# Patient Record
Sex: FEMALE | Race: WHITE | NOT HISPANIC OR LATINO | Employment: FULL TIME | RURAL
[De-identification: names, ages, dates, MRNs, and addresses within clinical notes are randomized per-mention and may not be internally consistent; named-entity substitution may affect disease eponyms.]

---

## 2021-10-14 ENCOUNTER — OFFICE VISIT (OUTPATIENT)
Dept: FAMILY MEDICINE CLINIC | Facility: CLINIC | Age: 31
End: 2021-10-14

## 2021-10-14 ENCOUNTER — LAB (OUTPATIENT)
Dept: LAB | Facility: HOSPITAL | Age: 31
End: 2021-10-14

## 2021-10-14 VITALS
HEIGHT: 66 IN | TEMPERATURE: 98 F | OXYGEN SATURATION: 100 % | DIASTOLIC BLOOD PRESSURE: 78 MMHG | SYSTOLIC BLOOD PRESSURE: 104 MMHG | WEIGHT: 269.13 LBS | HEART RATE: 76 BPM | RESPIRATION RATE: 20 BRPM | BODY MASS INDEX: 43.25 KG/M2

## 2021-10-14 DIAGNOSIS — M67.88 BILATERAL ACHILLES TENDONOSIS: ICD-10-CM

## 2021-10-14 DIAGNOSIS — Z13.1 DIABETES MELLITUS SCREENING: ICD-10-CM

## 2021-10-14 DIAGNOSIS — Z11.3 SCREENING EXAMINATION FOR STD (SEXUALLY TRANSMITTED DISEASE): ICD-10-CM

## 2021-10-14 DIAGNOSIS — Z86.39 HISTORY OF VITAMIN D DEFICIENCY: ICD-10-CM

## 2021-10-14 DIAGNOSIS — R09.89 CHEST CONGESTION: ICD-10-CM

## 2021-10-14 DIAGNOSIS — R53.83 FATIGUE, UNSPECIFIED TYPE: ICD-10-CM

## 2021-10-14 DIAGNOSIS — R07.9 LEFT-SIDED CHEST PAIN: ICD-10-CM

## 2021-10-14 DIAGNOSIS — E89.0 HYPOTHYROIDISM ASSOCIATED WITH SURGICAL PROCEDURE: ICD-10-CM

## 2021-10-14 DIAGNOSIS — R07.9 LEFT-SIDED CHEST PAIN: Primary | ICD-10-CM

## 2021-10-14 DIAGNOSIS — E89.0 HYPOTHYROIDISM ASSOCIATED WITH SURGICAL PROCEDURE: Chronic | ICD-10-CM

## 2021-10-14 DIAGNOSIS — J06.9 RECENT URI: ICD-10-CM

## 2021-10-14 DIAGNOSIS — Z83.49 FAMILY HISTORY OF B12 DEFICIENCY: ICD-10-CM

## 2021-10-14 DIAGNOSIS — Z13.220 LIPID SCREENING: ICD-10-CM

## 2021-10-14 DIAGNOSIS — M21.611 BUNION, RIGHT FOOT: ICD-10-CM

## 2021-10-14 LAB
25(OH)D3 SERPL-MCNC: 25.8 NG/ML (ref 30–100)
ALBUMIN SERPL-MCNC: 4.4 G/DL (ref 3.5–5.2)
ALBUMIN/GLOB SERPL: 1.6 G/DL
ALP SERPL-CCNC: 84 U/L (ref 39–117)
ALT SERPL W P-5'-P-CCNC: 48 U/L (ref 1–33)
ANION GAP SERPL CALCULATED.3IONS-SCNC: 12.8 MMOL/L (ref 5–15)
AST SERPL-CCNC: 32 U/L (ref 1–32)
BILIRUB SERPL-MCNC: 0.5 MG/DL (ref 0–1.2)
BUN SERPL-MCNC: 12 MG/DL (ref 6–20)
BUN/CREAT SERPL: 15.2 (ref 7–25)
CALCIUM SPEC-SCNC: 9.1 MG/DL (ref 8.6–10.5)
CANDIDA ALBICANS: NEGATIVE
CHLORIDE SERPL-SCNC: 103 MMOL/L (ref 98–107)
CHOLEST SERPL-MCNC: 238 MG/DL (ref 0–200)
CO2 SERPL-SCNC: 24.2 MMOL/L (ref 22–29)
CREAT SERPL-MCNC: 0.79 MG/DL (ref 0.57–1)
DEPRECATED RDW RBC AUTO: 51.2 FL (ref 37–54)
ERYTHROCYTE [DISTWIDTH] IN BLOOD BY AUTOMATED COUNT: 14.4 % (ref 12.3–15.4)
FOLATE SERPL-MCNC: 10.1 NG/ML (ref 4.78–24.2)
GARDNERELLA VAGINALIS: NEGATIVE
GFR SERPL CREATININE-BSD FRML MDRD: 84 ML/MIN/1.73
GLOBULIN UR ELPH-MCNC: 2.8 GM/DL
GLUCOSE SERPL-MCNC: 85 MG/DL (ref 65–99)
HBA1C MFR BLD: 5.44 % (ref 4.8–5.6)
HCT VFR BLD AUTO: 39.7 % (ref 34–46.6)
HDLC SERPL-MCNC: 53 MG/DL (ref 40–60)
HGB BLD-MCNC: 13.8 G/DL (ref 12–15.9)
IRON 24H UR-MRATE: 141 MCG/DL (ref 37–145)
IRON SATN MFR SERPL: 34 % (ref 20–50)
LDLC SERPL CALC-MCNC: 160 MG/DL (ref 0–100)
LDLC/HDLC SERPL: 2.96 {RATIO}
MCH RBC QN AUTO: 33.8 PG (ref 26.6–33)
MCHC RBC AUTO-ENTMCNC: 34.8 G/DL (ref 31.5–35.7)
MCV RBC AUTO: 97.3 FL (ref 79–97)
PLATELET # BLD AUTO: 282 10*3/MM3 (ref 140–450)
PMV BLD AUTO: 10.6 FL (ref 6–12)
POTASSIUM SERPL-SCNC: 4.2 MMOL/L (ref 3.5–5.2)
PROT SERPL-MCNC: 7.2 G/DL (ref 6–8.5)
RBC # BLD AUTO: 4.08 10*6/MM3 (ref 3.77–5.28)
SODIUM SERPL-SCNC: 140 MMOL/L (ref 136–145)
T VAGINALIS DNA VAG QL PROBE+SIG AMP: NEGATIVE
T3FREE SERPL-MCNC: 3.19 PG/ML (ref 2–4.4)
T4 FREE SERPL-MCNC: 1.35 NG/DL (ref 0.93–1.7)
TIBC SERPL-MCNC: 411 MCG/DL (ref 298–536)
TRANSFERRIN SERPL-MCNC: 276 MG/DL (ref 200–360)
TRIGL SERPL-MCNC: 140 MG/DL (ref 0–150)
TSH SERPL DL<=0.05 MIU/L-ACNC: 2.93 UIU/ML (ref 0.27–4.2)
VIT B12 BLD-MCNC: <150 PG/ML (ref 211–946)
VLDLC SERPL-MCNC: 25 MG/DL (ref 5–40)
WBC # BLD AUTO: 7.32 10*3/MM3 (ref 3.4–10.8)

## 2021-10-14 PROCEDURE — 99204 OFFICE O/P NEW MOD 45 MIN: CPT | Performed by: NURSE PRACTITIONER

## 2021-10-14 PROCEDURE — 93010 ELECTROCARDIOGRAM REPORT: CPT | Performed by: INTERNAL MEDICINE

## 2021-10-14 PROCEDURE — 93005 ELECTROCARDIOGRAM TRACING: CPT | Performed by: NURSE PRACTITIONER

## 2021-10-14 PROCEDURE — 80050 GENERAL HEALTH PANEL: CPT

## 2021-10-14 PROCEDURE — 82607 VITAMIN B-12: CPT

## 2021-10-14 PROCEDURE — 82306 VITAMIN D 25 HYDROXY: CPT

## 2021-10-14 PROCEDURE — 86769 SARS-COV-2 COVID-19 ANTIBODY: CPT

## 2021-10-14 PROCEDURE — 82746 ASSAY OF FOLIC ACID SERUM: CPT

## 2021-10-14 PROCEDURE — 83540 ASSAY OF IRON: CPT

## 2021-10-14 PROCEDURE — 84439 ASSAY OF FREE THYROXINE: CPT

## 2021-10-14 PROCEDURE — 84466 ASSAY OF TRANSFERRIN: CPT

## 2021-10-14 PROCEDURE — 87661 TRICHOMONAS VAGINALIS AMPLIF: CPT | Performed by: NURSE PRACTITIONER

## 2021-10-14 PROCEDURE — 87510 GARDNER VAG DNA DIR PROBE: CPT | Performed by: NURSE PRACTITIONER

## 2021-10-14 PROCEDURE — 87591 N.GONORRHOEAE DNA AMP PROB: CPT | Performed by: NURSE PRACTITIONER

## 2021-10-14 PROCEDURE — 84481 FREE ASSAY (FT-3): CPT

## 2021-10-14 PROCEDURE — 87660 TRICHOMONAS VAGIN DIR PROBE: CPT | Performed by: NURSE PRACTITIONER

## 2021-10-14 PROCEDURE — 96372 THER/PROPH/DIAG INJ SC/IM: CPT | Performed by: NURSE PRACTITIONER

## 2021-10-14 PROCEDURE — 83036 HEMOGLOBIN GLYCOSYLATED A1C: CPT

## 2021-10-14 PROCEDURE — 80061 LIPID PANEL: CPT

## 2021-10-14 PROCEDURE — 87491 CHLMYD TRACH DNA AMP PROBE: CPT | Performed by: NURSE PRACTITIONER

## 2021-10-14 PROCEDURE — 87480 CANDIDA DNA DIR PROBE: CPT | Performed by: NURSE PRACTITIONER

## 2021-10-14 RX ORDER — TESTOSTERONE CYPIONATE 200 MG/ML
10 INJECTION INTRAMUSCULAR ONCE
Status: COMPLETED | OUTPATIENT
Start: 2021-10-14 | End: 2021-10-14

## 2021-10-14 RX ORDER — LEVOTHYROXINE SODIUM 175 MCG
TABLET ORAL
COMMUNITY
Start: 2021-07-15 | End: 2022-02-24 | Stop reason: SDUPTHER

## 2021-10-14 RX ORDER — FUROSEMIDE 20 MG/1
TABLET ORAL
COMMUNITY

## 2021-10-14 RX ORDER — ALBUTEROL SULFATE 2.5 MG/3ML
2.5 SOLUTION RESPIRATORY (INHALATION) ONCE
Status: COMPLETED | OUTPATIENT
Start: 2021-10-14 | End: 2021-10-14

## 2021-10-14 RX ADMIN — TESTOSTERONE CYPIONATE 10 MG: 200 INJECTION INTRAMUSCULAR at 10:54

## 2021-10-14 RX ADMIN — ALBUTEROL SULFATE 2.5 MG: 2.5 SOLUTION RESPIRATORY (INHALATION) at 10:48

## 2021-10-14 NOTE — PROGRESS NOTES
"Chief Complaint  earache (bilateral)/headache pain in chest    Subjective          Sherrie Smith presents to [facility] - [facility]    PCP: just moved to [place], plans to establish with PCP at [facility]    CC: \"ear infection; lungs hurting; achilles pain; fatigue; chest pain\"    Presents with multiple complaints today wanting addressed until she can establish with new PCP.  Has not seen previous PCP (ABIOLA White in [place]) recently.      1. C/O illness since 9-14 including earache, head pressure/headaches, dizziness.  Reports she was seen at [facility] on 2 occasions--10/3 was dx with double OM and treated with Augmentin, returned on 10-8 with worsening symptoms and was diagnosed with bilateral pneumonia.  Had negative Covid test at that visit.  Was treated with Rocephin and Levaquin.  Denies cough, only c/o chest pain and occasional dyspnea.  Has not had Covid test.  Symptoms really haven't improved with treatment and would like to be evaluated today.  Was given Flonase at initial [facility] appt on 10/3, but stopped using.  Not taking anything else to assist. Denies loss of smell/taste.     2. C/O longstanding fatigue.  Reports she had thyroid nodules and enlargement with subsequent thyroidectomy in June 2021.  Last had thyroid levels checked in August and hasn't followed up since.  Admits that she isn't taking meds regularly.  Also c/o history of B12 and Vit D deficiency and was on B12 injections at one time, but hasn't had levels checked or treated in a long time.      3.  C/O bilateral leg swelling on/off for the last 6-12 months daily.  Seems to be worse by the end of the day.  Had been prescribed Lasix at one time, but stopped taking due to concerns about her potassium getting too low.  Doesn't wear compression stockings.  Currently working in medical records, sits for long periods of time.  Trying to eat healthy and doesn't " "believe she is eating a lot of sodium.  Reports she doesn't believe it is associated with her cycles/hormones as she has irregular cycles and has been dx with endometriosis in the past.      4.  Requesting STD screening today as well.  Denies current symptoms or known exposures.  Denies new partners.      5.  C/O achilles \"knots\" for a while and she has pain with walking.  Longstanding history of bunions and has surgery on left foot in the past and was recommended on right, but never had done.  Still has pain/stiffness in left foot and chronic pain in right foot.  Would like to see podiatry.       Review of Systems   Constitutional: Positive for fatigue and unexpected weight change (reports weight gain). Negative for appetite change and fever.   HENT: Positive for congestion, ear pain and sinus pressure (at times). Negative for ear discharge, postnasal drip, rhinorrhea, sinus pain, sore throat and trouble swallowing.    Eyes: Negative.    Respiratory: Positive for shortness of breath (occaisonal). Negative for cough, chest tightness and wheezing.    Cardiovascular: Positive for chest pain (mid to left side) and leg swelling (chronic). Negative for palpitations.   Gastrointestinal:        Sees Dr. Sinclair--had upper/lower scope earlier this year and polyp was removed     Genitourinary: Negative.    Musculoskeletal: Positive for arthralgias (bilateral foot pain).   Skin: Negative.    Allergic/Immunologic: Positive for food allergies (seen by Dr. Salazar earlier this year and had allergy testing--reports allergies to milk, corn, wheat and peanuts).   Neurological: Positive for dizziness, light-headedness and headaches. Negative for speech difficulty.        Objective   Vital Signs:   /78 (BP Location: Right arm, Patient Position: Sitting, Cuff Size: Large Adult)   Pulse 76   Temp 98 °F (36.7 °C) (Temporal)   Resp 20   Ht 167.6 cm (66\")   Wt 122 kg (269 lb 2 oz)   SpO2 100%   BMI 43.44 kg/m²       Physical " Exam  Vitals and nursing note reviewed.   Constitutional:       General: She is not in acute distress.     Appearance: Normal appearance. She is obese. She is not ill-appearing.   HENT:      Head: Normocephalic and atraumatic.      Right Ear: Ear canal normal. A middle ear effusion (mild) is present. Tympanic membrane is not injected or erythematous.      Left Ear: Ear canal normal. A middle ear effusion ( minimal) is present. Tympanic membrane is not injected or erythematous.      Nose: Congestion (mild) present.      Right Sinus: No maxillary sinus tenderness or frontal sinus tenderness.      Left Sinus: No maxillary sinus tenderness or frontal sinus tenderness.      Mouth/Throat:      Mouth: Mucous membranes are moist.      Pharynx: Oropharynx is clear. No pharyngeal swelling, oropharyngeal exudate or posterior oropharyngeal erythema.      Comments: Denies localized sinus pressure, but c/o generalized head pressure/headaches  Eyes:      General: No scleral icterus.        Right eye: No discharge.         Left eye: No discharge.      Conjunctiva/sclera: Conjunctivae normal.      Pupils: Pupils are equal, round, and reactive to light.   Neck:      Comments: Surgical scar noted to anterior neck    Cardiovascular:      Rate and Rhythm: Normal rate and regular rhythm.      Pulses:           Dorsalis pedis pulses are 2+ on the right side and 2+ on the left side.        Posterior tibial pulses are 2+ on the right side and 2+ on the left side.   Pulmonary:      Effort: Pulmonary effort is normal. No respiratory distress.      Breath sounds: No wheezing, rhonchi or rales.      Comments: Decreased aeration with significant congestion noted.  Albuterol neb in office. Patient denies any improvement in chest discomfort, but significantly improved aeration is noted.   Musculoskeletal:      Cervical back: Neck supple. No tenderness.      Right lower leg: Edema ( trace pitting) present.      Left lower leg: Edema ( trace  pitting) present.      Right foot: Bunion present.      Left foot: Bunion ( surgical scar noted) present.        Feet:    Feet:      Right foot:      Skin integrity: Skin integrity normal.      Left foot:      Skin integrity: Skin integrity normal.   Lymphadenopathy:      Cervical: No cervical adenopathy.   Skin:     General: Skin is warm and dry.      Capillary Refill: Capillary refill takes less than 2 seconds.   Neurological:      General: No focal deficit present.      Mental Status: She is alert and oriented to person, place, and time.   Psychiatric:         Mood and Affect: Mood normal.         Thought Content: Thought content normal.          Result Review :            EKG: NSR with sinus arrhythmia, 67 bpm.  Official cardiology read pending.      Assessment and Plan    Diagnoses and all orders for this visit:    1. Left-sided chest pain (Primary)  -     ECG 12 Lead  -     SARS-CoV-2 Antibodies (Roche); Future    2. Chest congestion  -     albuterol (PROVENTIL) nebulizer solution 0.083% 2.5 mg/3mL  -     dexamethasone sodium phosphate injection 10 mg  -     SARS-CoV-2 Antibodies (Roche); Future  -     XR Chest PA & Lateral    3. Bilateral Achilles tendonosis  -     Ambulatory Referral to Podiatry    4. Bunion, right foot  -     Ambulatory Referral to Podiatry    5. Fatigue, unspecified type  -     CBC No Differential; Future  -     Comprehensive metabolic panel; Future  -     Vitamin B12; Future  -     Folate; Future  -     Iron Profile; Future  -     SARS-CoV-2 Antibodies (Roche); Future  -     XR sinuses 3+ vw    6. Hypothyroidism associated with surgical procedure  -     TSH; Future  -     T3, free; Future  -     T4, free; Future    7. History of vitamin D deficiency  -     Vitamin D 25 hydroxy; Future    8. Family history of B12 deficiency  -     Vitamin B12; Future    9. Diabetes mellitus screening  -     Hemoglobin A1c; Future    10. Recent URI  -     SARS-CoV-2 Antibodies (Roche); Future  -     XR  sinuses 3+ vw  -     XR Chest PA & Lateral    11. Screening examination for STD (sexually transmitted disease)  -     Chlamydia trachomatis, Neisseria gonorrhoeae, Trichomonas vaginalis, PCR - Swab, Vagina  -     Gardnerella vaginalis, Trichomonas vaginalis, Candida albicans, DNA - Swab, Vagina    12. Lipid screening  -     Lipid panel; Future      Labs/xrays as above--will notify with results when available.      Decadron 10 mg IM x 1 in office  Restart Flonase daily  Declines need for inhaler at this time  Smoking cessation encouraged    Patient to schedule appt to establish care with PCP prior to leaving today    Referral to Podiatry placed  Depending on thyroid studies, may need referral to Endocrinology    RTW: 10-    Return to Same Day Clinic PRN      This document has been electronically signed by ABIOLA Hernandez on October 14, 2021 18:17 CDT,.    I spent 57 minutes caring for Sherrie on this date of service. This time includes time spent by me in the following activities:performing a medically appropriate examination and/or evaluation , counseling and educating the patient/family/caregiver, ordering medications, tests, or procedures, referring and communicating with other health care professionals  and documenting information in the medical record

## 2021-10-15 ENCOUNTER — TELEPHONE (OUTPATIENT)
Dept: FAMILY MEDICINE CLINIC | Facility: CLINIC | Age: 31
End: 2021-10-15

## 2021-10-15 DIAGNOSIS — E55.9 VITAMIN D INSUFFICIENCY: ICD-10-CM

## 2021-10-15 DIAGNOSIS — E78.5 HYPERLIPIDEMIA, UNSPECIFIED HYPERLIPIDEMIA TYPE: Primary | ICD-10-CM

## 2021-10-15 DIAGNOSIS — E53.8 B12 DEFICIENCY: Primary | ICD-10-CM

## 2021-10-15 LAB
C TRACH RRNA CVX QL NAA+PROBE: NEGATIVE
N GONORRHOEA RRNA SPEC QL NAA+PROBE: NEGATIVE
QT INTERVAL: 418 MS
QTC INTERVAL: 441 MS
SARS-COV-2 AB SERPL QL IA: NEGATIVE
TRICHOMONAS VAGINALIS PCR: NEGATIVE

## 2021-10-15 RX ORDER — CHOLECALCIFEROL (VITAMIN D3) 25 MCG
2000 CAPSULE ORAL DAILY
Qty: 60 CAPSULE | Refills: 5 | Status: SHIPPED | OUTPATIENT
Start: 2021-10-15 | End: 2022-05-24 | Stop reason: SDUPTHER

## 2021-10-15 RX ORDER — CHLORAL HYDRATE 500 MG
2000 CAPSULE ORAL
Qty: 30 CAPSULE | Refills: 5 | Status: SHIPPED | OUTPATIENT
Start: 2021-10-15 | End: 2022-09-09

## 2021-10-15 NOTE — TELEPHONE ENCOUNTER
Please give her a call back and let her know her cholesterol numbers were and how much omega 3 she needs to get.

## 2021-10-15 NOTE — PROGRESS NOTES
Pt notified of results, pt states she would like the b12 injections sent as an rx and she will give her self. Pt also wants vit d supp. Sent to pharmacy. Pt wants to take omega 3 and not cholesterol meds right now.

## 2021-10-19 NOTE — TELEPHONE ENCOUNTER
Randall has sent over her omega 3 to the pharmacy and her cholesterol results are accessible via her LMN-1t. I will send pt a message. :)

## 2021-11-01 ENCOUNTER — OFFICE VISIT (OUTPATIENT)
Dept: FAMILY MEDICINE CLINIC | Facility: CLINIC | Age: 31
End: 2021-11-01

## 2021-11-01 VITALS
WEIGHT: 278 LBS | OXYGEN SATURATION: 100 % | BODY MASS INDEX: 44.68 KG/M2 | TEMPERATURE: 97.1 F | DIASTOLIC BLOOD PRESSURE: 68 MMHG | HEART RATE: 88 BPM | SYSTOLIC BLOOD PRESSURE: 102 MMHG | HEIGHT: 66 IN

## 2021-11-01 DIAGNOSIS — N80.9 ENDOMETRIOSIS: ICD-10-CM

## 2021-11-01 DIAGNOSIS — E66.01 CLASS 3 SEVERE OBESITY WITHOUT SERIOUS COMORBIDITY WITH BODY MASS INDEX (BMI) OF 40.0 TO 44.9 IN ADULT, UNSPECIFIED OBESITY TYPE (HCC): Primary | ICD-10-CM

## 2021-11-01 PROBLEM — E03.9 HYPOTHYROIDISM: Status: ACTIVE | Noted: 2021-02-02

## 2021-11-01 PROCEDURE — 99214 OFFICE O/P EST MOD 30 MIN: CPT | Performed by: STUDENT IN AN ORGANIZED HEALTH CARE EDUCATION/TRAINING PROGRAM

## 2021-11-01 RX ORDER — MULTIPLE VITAMINS W/ MINERALS TAB 9MG-400MCG
1 TAB ORAL DAILY
COMMUNITY
End: 2022-09-09

## 2021-11-01 RX ORDER — PHENTERMINE HYDROCHLORIDE 37.5 MG/1
37.5 CAPSULE ORAL EVERY MORNING
Qty: 30 CAPSULE | Refills: 0 | Status: SHIPPED | OUTPATIENT
Start: 2021-11-01 | End: 2022-02-24 | Stop reason: SDUPTHER

## 2021-11-01 NOTE — PROGRESS NOTES
"Subjective:  Sherrie Smith is a 31 y.o. female who presents for     Endometriosis; states that has very irregular periods, was supposed to have hysterectomy but became pregnant approximately 4 years ago with her son. Since then, has not been reevaluated as her ob/gyn has since retired. Abdominal pain still present, intermittent, no menorrhagia, fever, chills, constipation, dysuria.     Obesity; states that over the past year has been working on increased diet, exercise and weight loss without success. Was hopeful that when her thyroid became well controlled that she would have an easier time losing weight. Interested in medications and nutrition referral at this time. Believe she was on phentermine in the past, did not have any issues with medication, denied cardiac history.    Patient Active Problem List   Diagnosis   • Hypothyroidism associated with surgical procedure   • Hypothyroidism     Vitals:    Vitals:    11/01/21 1730   BP: 102/68   BP Location: Left arm   Patient Position: Sitting   Cuff Size: Large Adult   Pulse: 88   Temp: 97.1 °F (36.2 °C)   SpO2: 100%   Weight: 126 kg (278 lb)   Height: 167.6 cm (66\")     Body mass index is 44.87 kg/m².    Current Outpatient Medications:   •  Cholecalciferol (Vitamin D-3) 25 MCG (1000 UT) capsule, Take 2,000 Units by mouth Daily., Disp: 60 capsule, Rfl: 5  •  furosemide (LASIX) 20 MG tablet, furosemide 20 mg tablet  TAKE ONE TABLET AS NEEDED FOR swelling as needed but not more THAN ONCE PER DAY, Disp: , Rfl:   •  linaclotide (LINZESS) 290 MCG capsule capsule, Take 290 mcg by mouth Daily As Needed., Disp: , Rfl:   •  multivitamin with minerals (MULTIVITAMIN ADULT PO), Take 1 tablet by mouth Daily., Disp: , Rfl:   •  Omega-3 Fatty Acids (fish oil) 1000 MG capsule capsule, Take 2 capsules by mouth Daily With Breakfast., Disp: 30 capsule, Rfl: 5  •  Synthroid 175 MCG tablet, TAKE ONE TABLET EVERY MORNING ON AN EMPTY STOMACH, Disp: , Rfl:   •  Cyanocobalamin 1000 MCG/ML " kit, Administer injection IM or SQ 3 times weekly x 2 weeks and then once monthly injections, Disp: 1 kit, Rfl: 11  •  phentermine 37.5 MG capsule, Take 1 capsule by mouth Every Morning., Disp: 30 capsule, Rfl: 0    Patient Active Problem List   Diagnosis   • Hypothyroidism associated with surgical procedure   • Hypothyroidism     Past Surgical History:   Procedure Laterality Date   • BUNIONECTOMY     • LAPAROSCOPY DIAGNOSTIC / BIOPSY / ASPIRATION / LYSIS     • THYROIDECTOMY       Social History     Socioeconomic History   • Marital status: Single   Tobacco Use   • Smoking status: Current Some Day Smoker     Types: Cigarettes   • Smokeless tobacco: Never Used   • Tobacco comment: a couple a week   Vaping Use   • Vaping Use: Never used   Substance and Sexual Activity   • Alcohol use: Never   • Drug use: Never   • Sexual activity: Yes     Partners: Male     Birth control/protection: Condom     Family History   Problem Relation Age of Onset   • Hyperlipidemia Mother    • Thyroid disease Mother    • Atrial fibrillation Father    • Thyroid disease Maternal Grandmother      Lab on 10/14/2021   Component Date Value Ref Range Status   • WBC 10/14/2021 7.32  3.40 - 10.80 10*3/mm3 Final   • RBC 10/14/2021 4.08  3.77 - 5.28 10*6/mm3 Final   • Hemoglobin 10/14/2021 13.8  12.0 - 15.9 g/dL Final   • Hematocrit 10/14/2021 39.7  34.0 - 46.6 % Final   • MCV 10/14/2021 97.3* 79.0 - 97.0 fL Final   • MCH 10/14/2021 33.8* 26.6 - 33.0 pg Final   • MCHC 10/14/2021 34.8  31.5 - 35.7 g/dL Final   • RDW 10/14/2021 14.4  12.3 - 15.4 % Final   • RDW-SD 10/14/2021 51.2  37.0 - 54.0 fl Final   • MPV 10/14/2021 10.6  6.0 - 12.0 fL Final   • Platelets 10/14/2021 282  140 - 450 10*3/mm3 Final   • Glucose 10/14/2021 85  65 - 99 mg/dL Final   • BUN 10/14/2021 12  6 - 20 mg/dL Final   • Creatinine 10/14/2021 0.79  0.57 - 1.00 mg/dL Final   • Sodium 10/14/2021 140  136 - 145 mmol/L Final   • Potassium 10/14/2021 4.2  3.5 - 5.2 mmol/L Final   •  Chloride 10/14/2021 103  98 - 107 mmol/L Final   • CO2 10/14/2021 24.2  22.0 - 29.0 mmol/L Final   • Calcium 10/14/2021 9.1  8.6 - 10.5 mg/dL Final   • Total Protein 10/14/2021 7.2  6.0 - 8.5 g/dL Final   • Albumin 10/14/2021 4.40  3.50 - 5.20 g/dL Final   • ALT (SGPT) 10/14/2021 48* 1 - 33 U/L Final   • AST (SGOT) 10/14/2021 32  1 - 32 U/L Final   • Alkaline Phosphatase 10/14/2021 84  39 - 117 U/L Final   • Total Bilirubin 10/14/2021 0.5  0.0 - 1.2 mg/dL Final   • eGFR Non  Amer 10/14/2021 84  >60 mL/min/1.73 Final   • Globulin 10/14/2021 2.8  gm/dL Final   • A/G Ratio 10/14/2021 1.6  g/dL Final   • BUN/Creatinine Ratio 10/14/2021 15.2  7.0 - 25.0 Final   • Anion Gap 10/14/2021 12.8  5.0 - 15.0 mmol/L Final   • TSH 10/14/2021 2.930  0.270 - 4.200 uIU/mL Final   • T3, Free 10/14/2021 3.19  2.00 - 4.40 pg/mL Final   • Free T4 10/14/2021 1.35  0.93 - 1.70 ng/dL Final   • 25 Hydroxy, Vitamin D 10/14/2021 25.8* 30.0 - 100.0 ng/ml Final   • Hemoglobin A1C 10/14/2021 5.44  4.80 - 5.60 % Final   • Vitamin B-12 10/14/2021 <150* 211 - 946 pg/mL Final   • Folate 10/14/2021 10.10  4.78 - 24.20 ng/mL Final   • Iron 10/14/2021 141  37 - 145 mcg/dL Final   • Iron Saturation 10/14/2021 34  20 - 50 % Final   • Transferrin 10/14/2021 276  200 - 360 mg/dL Final   • TIBC 10/14/2021 411  298 - 536 mcg/dL Final   • SARS-COV-2 ANTIBODIES 10/14/2021 Negative  Negative Final    This sample does not contain detectable SARS-CoV-2 antibodies. This  negative result does not rule out SARS-CoV-2 infection. Correlation  with epidemiologic risk factors and other clinical and laboratory  findings is recommended. Serologic results should not be used as the  sole basis to diagnose or exclude recent SARS-CoV-2 infection.  This assay will not detect antibodies induced by the currently  available SARS-CoV-2 vaccines. The current vaccines elicit  antibodies specific to the viral spike protein. [employer] offers  two test codes that detect viral  spike-specific antibodies:  250096 SARS-CoV-2 Semi-Quantitative Total Antibody, Juan and  073635 SARS-CoV-2 Antibody, IgG, Juan (Qualitative).  Positive results with this SARS-CoV-2 Antibodies, Nucleocapsid  assay suggest recent or previous natural infection with  SARS-CoV-2.   • Total Cholesterol 10/14/2021 238* 0 - 200 mg/dL Final   • Triglycerides 10/14/2021 140  0 - 150 mg/dL Final   • HDL Cholesterol 10/14/2021 53  40 - 60 mg/dL Final   • LDL Cholesterol  10/14/2021 160* 0 - 100 mg/dL Final   • VLDL Cholesterol 10/14/2021 25  5 - 40 mg/dL Final   • LDL/HDL Ratio 10/14/2021 2.96   Final   Office Visit on 10/14/2021   Component Date Value Ref Range Status   • QT Interval 10/14/2021 418  ms Final   • QTC Interval 10/14/2021 441  ms Final   • Chlamydia DNA by PCR 10/14/2021 Negative  Negative Final   • Neisseria gonorrhoeae by PCR 10/14/2021 Negative  Negative Final   • Trichomonas vaginalis PCR 10/14/2021 Negative  Negative, Invalid Final   • SLIME ALBICANS 10/14/2021 Negative  Negative Final   • GARDNERELLA VAGINALIS 10/14/2021 Negative  Negative Final   • TRICHOMONAS VAGINALIS 10/14/2021 Negative  Negative Final      XR sinuses 3+ vw  Narrative: Procedure:  Sinuses.        Indication:  Sinusitis. Head pressure.    Technique:  4 views   .    Prior relevant exam:  None.      No evidence of acute bony, soft tissue, or joint abnormality is  noted. Bone mineralization is within normal limits. The  visualized joint spaces appear intact. Normal paranasal sinuses.  No opacification.    No mucoperiosteal thickening or air-fluid levels.   Impression: 1.  Normal sinuses.       Electronically signed by:  Herson Chapa MD  10/14/2021 1:51 PM CDT  Workstation: [id number]  XR Chest PA & Lateral  Narrative: Chest x-ray PA and lateral     CLINICAL INDICATION: Shortness of breath. Recent pneumonia.    COMPARISON: Chest July 11, 2019.    FINDINGS: Cardiac silhouette is normal in size. Pulmonary  vascularity is unremarkable.     No  focal infiltrate or consolidation.  No pleural effusion.  No  pneumothorax.  Impression: No evidence of active disease.    Electronically signed by:  Herson Chapa MD  10/14/2021 1:27 PM CDT  Workstation: [id number]      @[employer]@  Immunization History   Administered Date(s) Administered   • DTP 1989, 1989, 1989, 07/22/1992, 08/06/1993   • Hep B, Adolescent or Pediatric 10/02/2006   • HiB 07/22/1992, 08/06/1993   • MMR 05/11/1990, 08/30/1994   • OPV 1989, 1989, 1989, 08/06/1993   • Td 10/02/2006   • Tdap 09/19/2016     The following portions of the patient's history were reviewed and updated as appropriate: allergies, current medications, past family history, past medical history, past social history, past surgical history and problem list.    PHQ-9 Total Score: 0         Physical Exam  Constitutional:       Appearance: Normal appearance. She is obese.   HENT:      Head: Normocephalic and atraumatic.      Right Ear: External ear normal.      Left Ear: External ear normal.   Eyes:      General:         Right eye: No discharge.         Left eye: No discharge.      Conjunctiva/sclera: Conjunctivae normal.   Cardiovascular:      Rate and Rhythm: Normal rate and regular rhythm.      Pulses: Normal pulses.      Heart sounds: Normal heart sounds. No murmur heard.      Pulmonary:      Effort: Pulmonary effort is normal. No respiratory distress.      Breath sounds: Normal breath sounds.   Abdominal:      General: There is no distension.      Palpations: Abdomen is soft.      Tenderness: There is no abdominal tenderness.   Musculoskeletal:      Cervical back: Normal range of motion.      Right lower leg: No edema.      Left lower leg: No edema.   Lymphadenopathy:      Cervical: No cervical adenopathy.   Neurological:      Mental Status: She is alert. Mental status is at baseline.   Psychiatric:         Mood and Affect: Mood normal.         Behavior: Behavior normal.         Assessment/Plan     Diagnosis Plan   1. Class 3 severe obesity without serious comorbidity with body mass index (BMI) of 40.0 to 44.9 in adult, unspecified obesity type (HCC)  Ambulatory Referral to Nutrition Services    phentermine 37.5 MG capsule   2. Endometriosis  Ambulatory Referral to Obstetrics / Gynecology      Orders Placed This Encounter   Procedures   • Ambulatory Referral to Nutrition Services     Referral Priority:   Routine     Referral Type:   Health Education     Referral Reason:   Specialty Services Required     Number of Visits Requested:   1   • Ambulatory Referral to Obstetrics / Gynecology     Referral Priority:   Routine     Referral Type:   Consultation     Referral Reason:   Specialty Services Required     Requested Specialty:   Obstetrics and Gynecology     Number of Visits Requested:   1     Obesity; will refer to nutrition, after prolonged discussion, due to BMI, benefits of phentermine outweigh risks. Discussed possible reflux medication, patient voiced understanding, Ty reviewed and appropriate, follow-up in 1 month, sooner if needed.    Endometriosis; needs referral to OB/GYN for further work-up, management. No red flags on exam today, reassuring.    Previous labs reviewed and reassuring, will address immunization, health maintenance issues at follow-up.          This document has been electronically signed by James Angulo MD on November 1, 2021 19:33 CDT

## 2021-11-18 ENCOUNTER — TELEPHONE (OUTPATIENT)
Dept: FAMILY MEDICINE CLINIC | Facility: CLINIC | Age: 31
End: 2021-11-18

## 2021-11-18 NOTE — TELEPHONE ENCOUNTER
Patient called stating she is having chest pain on the left side and continued to say all over her left side. Patient was given recommendation to go to ER.

## 2021-11-23 ENCOUNTER — OFFICE VISIT (OUTPATIENT)
Dept: PODIATRY | Facility: CLINIC | Age: 31
End: 2021-11-23

## 2021-11-23 VITALS — OXYGEN SATURATION: 98 % | HEIGHT: 66 IN | HEART RATE: 106 BPM | BODY MASS INDEX: 44.68 KG/M2 | WEIGHT: 278 LBS

## 2021-11-23 DIAGNOSIS — M79.671 BILATERAL FOOT PAIN: Primary | ICD-10-CM

## 2021-11-23 DIAGNOSIS — Q66.221 METATARSUS ADDUCTUS OF BOTH FEET: ICD-10-CM

## 2021-11-23 DIAGNOSIS — M76.62 ACHILLES TENDINITIS OF BOTH LOWER EXTREMITIES: ICD-10-CM

## 2021-11-23 DIAGNOSIS — M76.61 ACHILLES TENDINITIS OF BOTH LOWER EXTREMITIES: ICD-10-CM

## 2021-11-23 DIAGNOSIS — Q66.222 METATARSUS ADDUCTUS OF BOTH FEET: ICD-10-CM

## 2021-11-23 DIAGNOSIS — M20.11 VALGUS DEFORMITY OF BOTH GREAT TOES: ICD-10-CM

## 2021-11-23 DIAGNOSIS — M20.12 VALGUS DEFORMITY OF BOTH GREAT TOES: ICD-10-CM

## 2021-11-23 DIAGNOSIS — M79.672 BILATERAL FOOT PAIN: Primary | ICD-10-CM

## 2021-11-23 DIAGNOSIS — M72.2 PLANTAR FASCIITIS: ICD-10-CM

## 2021-11-23 PROCEDURE — 99203 OFFICE O/P NEW LOW 30 MIN: CPT | Performed by: PODIATRIST

## 2021-11-23 RX ORDER — NAPROXEN 500 MG/1
TABLET ORAL
COMMUNITY
Start: 2021-11-19

## 2021-11-23 RX ORDER — LINACLOTIDE 145 UG/1
CAPSULE, GELATIN COATED ORAL
COMMUNITY
Start: 2021-11-19

## 2021-11-23 NOTE — PROGRESS NOTES
Sherrie Smith  1990  31 y.o. female     Patient came to clinic for bilateral foot pain     11/23/2021    Chief Complaint   Patient presents with   • Left Foot - Pain, Bunions   • Right Foot - Pain, Bunions       History of Present Illness    Sherrie Smith is a 31 y.o.female who presents to clinic with chief complaint of foot pain.  Pain is primarily localized to the Achilles tendon area on both feet.  She also complains of painful bunions with certain shoe gear.  Does relate that she has had previous bunion surgery to the left foot many years ago.  She has a residual bunion deformity with continued pain.  Patient denies any injuries.      Past Medical History:   Diagnosis Date   • Bunion    • Endometriosis    • Family history of thyroid problem    • Hammer toe    • Hyperlipidemia    • Hypothyroidism          Past Surgical History:   Procedure Laterality Date   • BUNIONECTOMY     • LAPAROSCOPY DIAGNOSTIC / BIOPSY / ASPIRATION / LYSIS     • THYROIDECTOMY           Family History   Problem Relation Age of Onset   • Hyperlipidemia Mother    • Thyroid disease Mother    • Atrial fibrillation Father    • Heart disease Father    • Thyroid disease Maternal Grandmother        Allergies   Allergen Reactions   • Lortab [Hydrocodone-Acetaminophen] Nausea And Vomiting       Social History     Socioeconomic History   • Marital status: Single   Tobacco Use   • Smoking status: Current Some Day Smoker     Types: Cigarettes   • Smokeless tobacco: Never Used   • Tobacco comment: a couple a week   Vaping Use   • Vaping Use: Never used   Substance and Sexual Activity   • Alcohol use: Never   • Drug use: Never   • Sexual activity: Yes     Partners: Male     Birth control/protection: Condom         Current Outpatient Medications   Medication Sig Dispense Refill   • Cholecalciferol (Vitamin D-3) 25 MCG (1000 UT) capsule Take 2,000 Units by mouth Daily. 60 capsule 5   • Cyanocobalamin 1000 MCG/ML kit Administer injection IM or SQ 3  "times weekly x 2 weeks and then once monthly injections 1 kit 11   • furosemide (LASIX) 20 MG tablet furosemide 20 mg tablet   TAKE ONE TABLET AS NEEDED FOR swelling as needed but not more THAN ONCE PER DAY     • Linzess 145 MCG capsule capsule      • multivitamin with minerals (MULTIVITAMIN ADULT PO) Take 1 tablet by mouth Daily.     • naproxen (NAPROSYN) 500 MG tablet      • Omega-3 Fatty Acids (fish oil) 1000 MG capsule capsule Take 2 capsules by mouth Daily With Breakfast. 30 capsule 5   • phentermine 37.5 MG capsule Take 1 capsule by mouth Every Morning. 30 capsule 0   • Synthroid 175 MCG tablet TAKE ONE TABLET EVERY MORNING ON AN EMPTY STOMACH       No current facility-administered medications for this visit.       Review of Systems   Constitutional: Negative.    HENT: Negative.    Eyes: Negative.    Cardiovascular: Positive for leg swelling.   Gastrointestinal: Negative.    Endocrine: Negative.    Genitourinary: Negative.    Musculoskeletal:        Foot pain    Skin: Negative.    Allergic/Immunologic: Negative.    Neurological: Negative.    Hematological: Negative.    Psychiatric/Behavioral: Negative.          OBJECTIVE    Pulse 106   Ht 167.6 cm (66\")   Wt 126 kg (278 lb)   SpO2 98%   BMI 44.87 kg/m²     Physical Exam  Vitals reviewed.   Constitutional:       General: She is not in acute distress.     Appearance: She is well-developed.   HENT:      Head: Normocephalic and atraumatic.      Nose: Nose normal.   Eyes:      Conjunctiva/sclera: Conjunctivae normal.      Pupils: Pupils are equal, round, and reactive to light.   Pulmonary:      Effort: Pulmonary effort is normal. No respiratory distress.      Breath sounds: No wheezing.   Musculoskeletal:         General: Tenderness and deformity present. Normal range of motion.   Skin:     General: Skin is warm and dry.      Capillary Refill: Capillary refill takes less than 2 seconds.   Neurological:      Mental Status: She is alert and oriented to person, " place, and time.   Psychiatric:         Behavior: Behavior normal.         Thought Content: Thought content normal.          Lower Extremity Exam:     Cardiovascular:    DP/PT pulses palpable b/l    CFT brisk  to all digits b/l  Skin temp is warm to warm from proximal tibia to distal digits b/l  Pedal hair growth present b/l  No erythema or edema noted b/l  Musculoskeletal:  Muscle strength is 5/5 for all muscle groups tested b/l  ROM of the 1st MTP is WNL on the right and decreased on the left with pain.  HAV deformity bilateral  ROM of the TMTJ is WNL b/l  ROM of the MTJ is WNL b/l  ROM of the STJ is  WNL b/l  ROM of the ankle joint is  WNL b/l  Slight pain on palpation to the mid substance of the Achilles tendon bilateral.  Thickening noted to the right Achilles tendon.  Dermatological:   Webspaces 1-4 b/l are clean, dry and intact.   No subcutaneous nodules or masses noted  b/l  Neurological:   Protective sensation intact b/l  Sensation intact to light touch b/l       Foot/Ankle Exam        Procedures        ASSESSMENT AND PLAN    Diagnoses and all orders for this visit:    1. Bilateral foot pain (Primary)  -     XR foot 3+ vw bilateral; Future    2. Valgus deformity of both great toes    3. Metatarsus adductus of both feet    4. Achilles tendinitis of both lower extremities    5. Plantar fasciitis        - Comprehensive foot and ankle exam performed  - Radiographs taken and reviewed. No acute osseous or articular abnormalities.  Moderate HAV deformity bilateral.  - Patient advised to perform stretching exercises, Ice and to make appropriate shoe gear changes to include wearing supportive shoes that do not irritate the back of the heel. Patient also given written instructions on how to correctly perform the stretching of the Achilles tendon/calf stretches. Limit strenuous activity for the next couple of weeks.   - Patient given Handout on Achilles Insertional Tendonitis.  - Discussed treatment of hallux valgus  deformities.  - Rx for custom orthotics  - All questions were answered to the patients satisfaction.  - RTC in 8 weeks             This document has been electronically signed by Denver Magallon DPM on November 24, 2021 16:13 CST     11/24/2021  16:13 CST

## 2021-11-29 ENCOUNTER — TRANSCRIBE ORDERS (OUTPATIENT)
Dept: PODIATRY | Facility: CLINIC | Age: 31
End: 2021-11-29

## 2021-11-29 DIAGNOSIS — M20.10 HALLUX ABDUCTO VALGUS, UNSPECIFIED LATERALITY: Primary | ICD-10-CM

## 2021-11-30 ENCOUNTER — TELEPHONE (OUTPATIENT)
Dept: FAMILY MEDICINE CLINIC | Facility: CLINIC | Age: 31
End: 2021-11-30

## 2021-11-30 NOTE — TELEPHONE ENCOUNTER
Called patient and had to leave a voicemail. Patient needs to reschedule her appointment with Dr. Angulo on Dec 2nd.   He will not have a MA after 4:00

## 2021-12-13 ENCOUNTER — HOSPITAL ENCOUNTER (OUTPATIENT)
Dept: PHYSICAL THERAPY | Facility: HOSPITAL | Age: 31
Setting detail: THERAPIES SERIES
Discharge: HOME | End: 2021-12-13

## 2021-12-13 DIAGNOSIS — M79.671 BILATERAL FOOT PAIN: ICD-10-CM

## 2021-12-13 DIAGNOSIS — M20.10 ACQUIRED HALLUX VALGUS, UNSPECIFIED LATERALITY: Primary | ICD-10-CM

## 2021-12-13 DIAGNOSIS — M79.672 BILATERAL FOOT PAIN: ICD-10-CM

## 2021-12-13 PROCEDURE — 97162 PT EVAL MOD COMPLEX 30 MIN: CPT | Performed by: PHYSICAL THERAPIST

## 2021-12-13 NOTE — THERAPY EVALUATION
Outpatient Physical Therapy Ortho Initial Evaluation  [facility]     Patient Name: Sherrie Smith  : 1990  MRN: [id number]  Today's Date: 2021      Visit Date: 2021     Patient seen for 1 PT sessions.  Patient reports N/A% of improvement.  Next MD appt: [facility]  Recertification: N/A.    Therapy Diagnosis: B hallux valgus/Orthotics        Patient Active Problem List   Diagnosis   • Hypothyroidism associated with surgical procedure   • Hypothyroidism        Past Medical History:   Diagnosis Date   • Bunion    • Endometriosis    • Family history of thyroid problem    • Hammer toe    • Hyperlipidemia    • Hypothyroidism         Past Surgical History:   Procedure Laterality Date   • BUNIONECTOMY     • LAPAROSCOPY DIAGNOSTIC / BIOPSY / ASPIRATION / LYSIS     • THYROIDECTOMY         Visit Dx:     ICD-10-CM ICD-9-CM   1. Acquired hallux valgus, unspecified laterality  M20.10 735.0   2. Bilateral foot pain  M79.671 729.5    M79.672           Patient History     Row Name 21 1600             History    Chief Complaint Pain  -AJ      Type of Pain Foot pain  -      Date Current Problem(s) Began --  Chronic, years  -AJ      Brief Description of Current Complaint patient reports she has been having trouble with her feet for a long time. She reports she has knots on the back of her achilles tendon. She rpeorts in  she had surgery on her L foot.  -AJ      Previous treatment for THIS PROBLEM Surgery  -      Surgery Date: --    -AJ      Patient/Caregiver Goals Relieve pain  -AJ      Current Tobacco Use None  -AJ      Smoking Status Former smoker  -AJ      Patient's Rating of General Health Very good  -AJ      Occupation/sports/leisure activities Occupation: [employer], medical records; Hobbies: 3yo son  -AJ      What clinical tests have you had for this problem? X-ray  -AJ      Results of Clinical Tests see EMR  -AJ      History of Previous Related Injuries None that can recall  -AJ               "Pain     Pain Location Foot  -AJ      Pain at Present 0  -AJ      Pain at Best 0  -AJ      Pain at Worst 8  -AJ      Pain Frequency Intermittent  -AJ      Pain Description Sharp  -AJ      What Performance Factors Make the Current Problem(s) WORSE? walking, standing  -AJ      What Performance Factors Make the Current Problem(s) BETTER? rest  -AJ            User Key  (r) = Recorded By, (t) = Taken By, (c) = Cosigned By    Initials Name Provider Type    Cynthia Ortiz PT DPT Physical Therapist                 PT Ortho     Row Name 12/13/21 1600       Subjective Comments    Subjective Comments see history  -AJ       Precautions and Contraindications    Precautions/Limitations no known precautions/limitations  -AJ       Subjective Pain    Able to rate subjective pain? yes  -AJ    Pre-Treatment Pain Level 0  -AJ    Post-Treatment Pain Level 0  -AJ       Posture/Observations    Alignment Options Foot pronation; Pes Planus  -AJ    Foot pronation Normal; Mild; Moderate; Decreased  Supination  -AJ    Pes Planus Bilateral:; Mild; Moderate; Decreased  Pes cavus  -AJ    Posture/Observations Comments No distress. wering B dress shoes with ~1\" heel  -AJ       Special Tests/Palpation    Special Tests/Palpation --  B great toe tenderness at MCP joint  -AJ       General ROM    GENERAL ROM COMMENTS AROM for B feet and toes WNL  -AJ       MMT (Manual Muscle Testing)    General MMT Comments Strength for B feet and toes WFL  -AJ       Sensation    Sensation WNL? WNL  -AJ    Light Touch No apparent deficits  -AJ       Transfers    Comment (Transfers) I with all transfers  -AJ       Gait/Stairs (Locomotion)    Comment (Gait/Stairs) FWB,, non-antalgic gait, no distress.  -AJ          User Key  (r) = Recorded By, (t) = Taken By, (c) = Cosigned By    Initials Name Provider Type    Cynthia Ortiz PT DPT Physical Therapist                            Therapy Education  Given:  (Shoe wear; POC)  Program: New  How Provided: " Verbal  Provided to: Patient  Level of Understanding: Verbalized      PT OP Goals     Row Name 12/13/21 1600          PT Short Term Goals    STG 1 Patient to be molded with B orthotics.  -AJ     STG 1 Progress Met  -AJ     STG 2 Patient to be fit with B orthotics  -AJ     STG 3 Patient to show understanding of proper shoe wear.  -AJ     STG 4 Patient to show understanding of orthotic use and care.  -AJ     STG 5 Patient to show understanding of orthotic wear.  -AJ            Long Term Goals    LTG 1 Patient to return for orthotic adjustments as needed.  -JW            Time Calculation    PT Goal Re-Cert Due Date --  N/A  -           User Key  (r) = Recorded By, (t) = Taken By, (c) = Cosigned By    Initials Name Provider Type    Cynthia Ortiz, PT DPT Physical Therapist              Barriers to Rehab: Include significant or possible arthritic/degenerative changes that have occurred within the joints, The chronicity of this issue, The patient's obesity.    Safety Issues: None noted.        PT Assessment/Plan     Row Name 12/13/21 1700          PT Assessment    Functional Limitations Impaired gait; Limitation in home management; Limitations in community activities; Performance in leisure activities; Performance in work activities  -     Impairments Pain; Joint integrity  -     Assessment Comments nIés is an obese 30 yo female who presents to the clinci today with B hallux valgus R>L. Also has calleous present on plantar surface near 5th toe and at the met head pads of B great toes. Patient was molded with B slipper cast orthotics to be sent off for fabrication at the lab according to MD specifications. Good molds were obtained today.  -AJ     Rehab Potential Good  -     Patient/caregiver participated in establishment of treatment plan and goals Yes  -AJ     Patient would benefit from skilled therapy intervention Yes  -AJ            PT Plan    PT Frequency --  1+1 = 2 visits  -AJ     Planned CPT's?  PT EVAL MOD COMPLELITY: 27938; PT THER SUPP EA 15 MIN  level 2 orthotics  -AJ     PT Plan Comments Instruct and fit with B custom orthotics.  -JW           User Key  (r) = Recorded By, (t) = Taken By, (c) = Cosigned By    Initials Name Provider Type    Cynthia Ortiz PT DPT Physical Therapist            Other therapeutic activities and/or exercises will be prescribed depending on the patient's progress or lack thereof.               Time Calculation:     Start Time: 1645  Stop Time: 1725  Time Calculation (min): 40 min     Therapy Charges for Today     Code Description Service Date Service Provider Modifiers Qty    [id number]  PT EVAL MOD COMPLEXITY 2 12/13/2021 Cynthia Mejia, PT DPT GP 1    [id number]  PT THER SUPP EA 15 MIN 12/13/2021 Cynthia Mejia PT DPT GP 1    [id number]  PT-CUSTOM ORTHOTICS-LEVEL 2 12/13/2021 Cynthia Mejia, PT DPT  1                  This document has been electronically signed by Cynthia Mejia PT GIRMA, [facility] on December 13, 2021 17:19 CST

## 2022-01-31 ENCOUNTER — HOSPITAL ENCOUNTER (OUTPATIENT)
Dept: PHYSICAL THERAPY | Facility: HOSPITAL | Age: 32
Setting detail: THERAPIES SERIES
Discharge: HOME | End: 2022-01-31

## 2022-01-31 DIAGNOSIS — M20.10 ACQUIRED HALLUX VALGUS, UNSPECIFIED LATERALITY: Primary | ICD-10-CM

## 2022-01-31 DIAGNOSIS — M79.671 BILATERAL FOOT PAIN: ICD-10-CM

## 2022-01-31 DIAGNOSIS — M79.672 BILATERAL FOOT PAIN: ICD-10-CM

## 2022-02-24 ENCOUNTER — OFFICE VISIT (OUTPATIENT)
Dept: FAMILY MEDICINE CLINIC | Facility: CLINIC | Age: 32
End: 2022-02-24

## 2022-02-24 VITALS
BODY MASS INDEX: 45.38 KG/M2 | HEART RATE: 84 BPM | WEIGHT: 282.4 LBS | DIASTOLIC BLOOD PRESSURE: 86 MMHG | OXYGEN SATURATION: 100 % | SYSTOLIC BLOOD PRESSURE: 134 MMHG | TEMPERATURE: 98.7 F | HEIGHT: 66 IN

## 2022-02-24 DIAGNOSIS — E55.9 VITAMIN D INSUFFICIENCY: ICD-10-CM

## 2022-02-24 DIAGNOSIS — E89.0 HYPOTHYROIDISM ASSOCIATED WITH SURGICAL PROCEDURE: Primary | ICD-10-CM

## 2022-02-24 DIAGNOSIS — Z11.59 NEED FOR HEPATITIS C SCREENING TEST: ICD-10-CM

## 2022-02-24 DIAGNOSIS — R80.9 PROTEINURIA, UNSPECIFIED TYPE: ICD-10-CM

## 2022-02-24 DIAGNOSIS — E66.01 CLASS 3 SEVERE OBESITY WITHOUT SERIOUS COMORBIDITY WITH BODY MASS INDEX (BMI) OF 40.0 TO 44.9 IN ADULT, UNSPECIFIED OBESITY TYPE: ICD-10-CM

## 2022-02-24 DIAGNOSIS — Z83.49 FAMILY HISTORY OF B12 DEFICIENCY: ICD-10-CM

## 2022-02-24 DIAGNOSIS — Z12.4 CERVICAL CANCER SCREENING: ICD-10-CM

## 2022-02-24 PROCEDURE — 99214 OFFICE O/P EST MOD 30 MIN: CPT | Performed by: STUDENT IN AN ORGANIZED HEALTH CARE EDUCATION/TRAINING PROGRAM

## 2022-02-24 RX ORDER — LEVOTHYROXINE SODIUM 175 MCG
175 TABLET ORAL DAILY
Qty: 90 TABLET | Refills: 1 | Status: SHIPPED | OUTPATIENT
Start: 2022-02-24 | End: 2022-05-25

## 2022-02-24 RX ORDER — PHENTERMINE HYDROCHLORIDE 37.5 MG/1
37.5 CAPSULE ORAL EVERY MORNING
Qty: 30 CAPSULE | Refills: 0 | Status: SHIPPED | OUTPATIENT
Start: 2022-02-24 | End: 2022-05-24

## 2022-02-24 NOTE — PROGRESS NOTES
"Subjective:  Sherrie Smith is a 32 y.o. female who presents for     Hypothyroidism; patient states needs refill on Synthroid 175 mcg daily, has been on medication for years, no adverse effects, states last thyroid level was within normal limits.  Denied any symptoms, palpitation, weakness, constipation, fatigue, cold intolerance.    Obesity; seen in November, started on phentermine, tolerate medication well however unable to make follow-up appointment due to Covid and office closures.  No adverse effects medication, would like refills today if possible, continues to struggle with weight and has been working on diet, exercise.    Patient Active Problem List   Diagnosis   • Hypothyroidism associated with surgical procedure   • Hypothyroidism     Vitals:    Vitals:    02/24/22 1646   BP: 134/86   BP Location: Right arm   Patient Position: Sitting   Cuff Size: Large Adult   Pulse: 84   Temp: 98.7 °F (37.1 °C)   SpO2: 100%   Weight: 128 kg (282 lb 6.4 oz)   Height: 167.6 cm (66\")     Body mass index is 45.58 kg/m².      Current Outpatient Medications:   •  Cholecalciferol (Vitamin D-3) 25 MCG (1000 UT) capsule, Take 2,000 Units by mouth Daily., Disp: 60 capsule, Rfl: 5  •  Cyanocobalamin 1000 MCG/ML kit, Administer injection IM or SQ 3 times weekly x 2 weeks and then once monthly injections, Disp: 1 kit, Rfl: 11  •  furosemide (LASIX) 20 MG tablet, furosemide 20 mg tablet  TAKE ONE TABLET AS NEEDED FOR swelling as needed but not more THAN ONCE PER DAY, Disp: , Rfl:   •  Linzess 145 MCG capsule capsule, , Disp: , Rfl:   •  multivitamin with minerals (MULTIVITAMIN ADULT PO), Take 1 tablet by mouth Daily., Disp: , Rfl:   •  naproxen (NAPROSYN) 500 MG tablet, , Disp: , Rfl:   •  Omega-3 Fatty Acids (fish oil) 1000 MG capsule capsule, Take 2 capsules by mouth Daily With Breakfast., Disp: 30 capsule, Rfl: 5  •  phentermine 37.5 MG capsule, Take 1 capsule by mouth Every Morning., Disp: 30 capsule, Rfl: 0  •  Synthroid 175 MCG " tablet, Take 1 tablet by mouth Daily., Disp: 90 tablet, Rfl: 1    Patient Active Problem List   Diagnosis   • Hypothyroidism associated with surgical procedure   • Hypothyroidism     Past Surgical History:   Procedure Laterality Date   • BUNIONECTOMY     • LAPAROSCOPY DIAGNOSTIC / BIOPSY / ASPIRATION / LYSIS     • THYROIDECTOMY       Social History     Socioeconomic History   • Marital status: Single   Tobacco Use   • Smoking status: Former Smoker     Types: Cigarettes   • Smokeless tobacco: Never Used   • Tobacco comment: a couple a week   Vaping Use   • Vaping Use: Never used   Substance and Sexual Activity   • Alcohol use: Never   • Drug use: Never   • Sexual activity: Yes     Partners: Male     Birth control/protection: Condom     Family History   Problem Relation Age of Onset   • Hyperlipidemia Mother    • Thyroid disease Mother    • Atrial fibrillation Father    • Heart disease Father    • Thyroid disease Maternal Grandmother      Lab on 10/14/2021   Component Date Value Ref Range Status   • WBC 10/14/2021 7.32  3.40 - 10.80 10*3/mm3 Final   • RBC 10/14/2021 4.08  3.77 - 5.28 10*6/mm3 Final   • Hemoglobin 10/14/2021 13.8  12.0 - 15.9 g/dL Final   • Hematocrit 10/14/2021 39.7  34.0 - 46.6 % Final   • MCV 10/14/2021 97.3* 79.0 - 97.0 fL Final   • MCH 10/14/2021 33.8* 26.6 - 33.0 pg Final   • MCHC 10/14/2021 34.8  31.5 - 35.7 g/dL Final   • RDW 10/14/2021 14.4  12.3 - 15.4 % Final   • RDW-SD 10/14/2021 51.2  37.0 - 54.0 fl Final   • MPV 10/14/2021 10.6  6.0 - 12.0 fL Final   • Platelets 10/14/2021 282  140 - 450 10*3/mm3 Final   • Glucose 10/14/2021 85  65 - 99 mg/dL Final   • BUN 10/14/2021 12  6 - 20 mg/dL Final   • Creatinine 10/14/2021 0.79  0.57 - 1.00 mg/dL Final   • Sodium 10/14/2021 140  136 - 145 mmol/L Final   • Potassium 10/14/2021 4.2  3.5 - 5.2 mmol/L Final   • Chloride 10/14/2021 103  98 - 107 mmol/L Final   • CO2 10/14/2021 24.2  22.0 - 29.0 mmol/L Final   • Calcium 10/14/2021 9.1  8.6 - 10.5 mg/dL  Final   • Total Protein 10/14/2021 7.2  6.0 - 8.5 g/dL Final   • Albumin 10/14/2021 4.40  3.50 - 5.20 g/dL Final   • ALT (SGPT) 10/14/2021 48* 1 - 33 U/L Final   • AST (SGOT) 10/14/2021 32  1 - 32 U/L Final   • Alkaline Phosphatase 10/14/2021 84  39 - 117 U/L Final   • Total Bilirubin 10/14/2021 0.5  0.0 - 1.2 mg/dL Final   • eGFR Non  Amer 10/14/2021 84  >60 mL/min/1.73 Final   • Globulin 10/14/2021 2.8  gm/dL Final   • A/G Ratio 10/14/2021 1.6  g/dL Final   • BUN/Creatinine Ratio 10/14/2021 15.2  7.0 - 25.0 Final   • Anion Gap 10/14/2021 12.8  5.0 - 15.0 mmol/L Final   • TSH 10/14/2021 2.930  0.270 - 4.200 uIU/mL Final   • T3, Free 10/14/2021 3.19  2.00 - 4.40 pg/mL Final   • Free T4 10/14/2021 1.35  0.93 - 1.70 ng/dL Final   • 25 Hydroxy, Vitamin D 10/14/2021 25.8* 30.0 - 100.0 ng/ml Final   • Hemoglobin A1C 10/14/2021 5.44  4.80 - 5.60 % Final   • Vitamin B-12 10/14/2021 <150* 211 - 946 pg/mL Final   • Folate 10/14/2021 10.10  4.78 - 24.20 ng/mL Final   • Iron 10/14/2021 141  37 - 145 mcg/dL Final   • Iron Saturation 10/14/2021 34  20 - 50 % Final   • Transferrin 10/14/2021 276  200 - 360 mg/dL Final   • TIBC 10/14/2021 411  298 - 536 mcg/dL Final   • SARS-COV-2 ANTIBODIES 10/14/2021 Negative  Negative Final    This sample does not contain detectable SARS-CoV-2 antibodies. This  negative result does not rule out SARS-CoV-2 infection. Correlation  with epidemiologic risk factors and other clinical and laboratory  findings is recommended. Serologic results should not be used as the  sole basis to diagnose or exclude recent SARS-CoV-2 infection.  This assay will not detect antibodies induced by the currently  available SARS-CoV-2 vaccines. The current vaccines elicit  antibodies specific to the viral spike protein. [employer] offers  two test codes that detect viral spike-specific antibodies:  216607 SARS-CoV-2 Semi-Quantitative Total Antibody, Juan and  832074 SARS-CoV-2 Antibody, IgG, Juan  (Qualitative).  Positive results with this SARS-CoV-2 Antibodies, Nucleocapsid  assay suggest recent or previous natural infection with  SARS-CoV-2.   • Total Cholesterol 10/14/2021 238* 0 - 200 mg/dL Final   • Triglycerides 10/14/2021 140  0 - 150 mg/dL Final   • HDL Cholesterol 10/14/2021 53  40 - 60 mg/dL Final   • LDL Cholesterol  10/14/2021 160* 0 - 100 mg/dL Final   • VLDL Cholesterol 10/14/2021 25  5 - 40 mg/dL Final   • LDL/HDL Ratio 10/14/2021 2.96   Final   Office Visit on 10/14/2021   Component Date Value Ref Range Status   • QT Interval 10/14/2021 418  ms Final   • QTC Interval 10/14/2021 441  ms Final   • Chlamydia DNA by PCR 10/14/2021 Negative  Negative Final   • Neisseria gonorrhoeae by PCR 10/14/2021 Negative  Negative Final   • Trichomonas vaginalis PCR 10/14/2021 Negative  Negative, Invalid Final   • SLIME ALBICANS 10/14/2021 Negative  Negative Final   • GARDNERELLA VAGINALIS 10/14/2021 Negative  Negative Final   • TRICHOMONAS VAGINALIS 10/14/2021 Negative  Negative Final      XR foot 3+ vw bilateral  Narrative: PROCEDURE: 3 views of the right left foot    COMPARISON: No comparison     HISTORY: Foot pain    FINDINGS: 3 weightbearing views of the right and left foot reviewed.  No   fracture or dislocation.  No osseous erosion or suspicious osseous lesion.    Normal bone mineral density.  No increased soft tissue density.  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  Impression:  Negative for acute osseous or articular abnormality.         [unfilled]  Immunization History   Administered Date(s) Administered   • COVID-19 (MODERNA) 1st, 2nd, 3rd Dose Only 11/28/2021   • DTP 1990, 1990, 1989, 07/22/1992, 08/06/1993   • Hep B, Adolescent or Pediatric 10/02/2006   • HiB 07/22/1992, 08/06/1993   • MMR 05/11/1990, 08/30/1994   • OPV 1989, 1989, 1989, 08/06/1993   • Td 10/02/2006   • Tdap 09/19/2016     The following portions of the patient's history  were reviewed and updated as appropriate: allergies, current medications, past family history, past medical history, past social history, past surgical history and problem list.    PHQ-9 Total Score:           Physical Exam  Constitutional:       Appearance: Normal appearance. She is obese.   HENT:      Head: Normocephalic and atraumatic.      Right Ear: External ear normal.      Left Ear: External ear normal.   Eyes:      General:         Right eye: No discharge.         Left eye: No discharge.      Conjunctiva/sclera: Conjunctivae normal.   Cardiovascular:      Rate and Rhythm: Normal rate and regular rhythm.      Pulses: Normal pulses.      Heart sounds: Normal heart sounds. No murmur heard.      Pulmonary:      Effort: Pulmonary effort is normal. No respiratory distress.      Breath sounds: Normal breath sounds.   Abdominal:      General: There is no distension.      Palpations: Abdomen is soft.      Tenderness: There is no abdominal tenderness.   Musculoskeletal:      Cervical back: Normal range of motion.      Right lower leg: No edema.      Left lower leg: No edema.   Lymphadenopathy:      Cervical: No cervical adenopathy.   Neurological:      Mental Status: She is alert. Mental status is at baseline.   Psychiatric:         Mood and Affect: Mood normal.         Behavior: Behavior normal.       Assessment/Plan    Diagnosis Plan   1. Hypothyroidism associated with surgical procedure  Synthroid 175 MCG tablet    TSH Rfx On Abnormal To Free T4   2. Cervical cancer screening  Ambulatory Referral to Gynecology   3. Proteinuria, unspecified type  Basic metabolic panel    Urinalysis With Microscopic - Urine, Clean Catch   4. Vitamin D insufficiency  Vitamin D 25 Hydroxy   5. Family history of B12 deficiency  Vitamin B12   6. Need for hepatitis C screening test  HCV Antibody Rfx To Qnt PCR   7. Class 3 severe obesity without serious comorbidity with body mass index (BMI) of 40.0 to 44.9 in adult, unspecified obesity  type (HCC)  phentermine 37.5 MG capsule      Orders Placed This Encounter   Procedures   • Basic metabolic panel     Order Specific Question:   Release to patient     Answer:   Immediate   • TSH Rfx On Abnormal To Free T4     Order Specific Question:   Release to patient     Answer:   Immediate   • Vitamin D 25 Hydroxy     Order Specific Question:   Release to patient     Answer:   Immediate   • Vitamin B12     Order Specific Question:   Release to patient     Answer:   Immediate   • HCV Antibody Rfx To Qnt PCR     Order Specific Question:   Release to patient     Answer:   Immediate   • Ambulatory Referral to Gynecology     Referral Priority:   Routine     Referral Type:   Consultation     Referral Reason:   Specialty Services Required     Requested Specialty:   Gynecology     Number of Visits Requested:   1   • Urinalysis With Microscopic - Urine, Clean Catch     Order Specific Question:   Release to patient     Answer:   Immediate     Obesity; benefits of phentermine outweigh risks, previously tolerated, will refill today.  Ty reviewed appropriate, no signs of abuse, misuse.  Follow-up in 1 month, sooner if needed.    Screenings as above, refill thyroid medication, recheck thyroid levels adjust as needed.          This document has been electronically signed by James Angulo MD on February 24, 2022 17:27 CST    EMR Dragon/Transciption Disclaimer: Some of this note may be an electronic transcription/translation of spoken language to printed text.  The electronic translation of spoken language may permit erroneous, or at times, nonsensical words or phrases to be inadvertently transcribed. Although I have reviewed the note for such errors, some may still exist.

## 2022-03-14 ENCOUNTER — OFFICE VISIT (OUTPATIENT)
Dept: OBSTETRICS AND GYNECOLOGY | Facility: CLINIC | Age: 32
End: 2022-03-14

## 2022-03-14 ENCOUNTER — LAB (OUTPATIENT)
Dept: LAB | Facility: HOSPITAL | Age: 32
End: 2022-03-14

## 2022-03-14 VITALS
WEIGHT: 288 LBS | SYSTOLIC BLOOD PRESSURE: 124 MMHG | HEIGHT: 66 IN | BODY MASS INDEX: 46.28 KG/M2 | DIASTOLIC BLOOD PRESSURE: 70 MMHG

## 2022-03-14 DIAGNOSIS — R10.2 PELVIC PAIN: ICD-10-CM

## 2022-03-14 DIAGNOSIS — Z11.3 ROUTINE SCREENING FOR STI (SEXUALLY TRANSMITTED INFECTION): ICD-10-CM

## 2022-03-14 DIAGNOSIS — L70.9 ACNE, UNSPECIFIED ACNE TYPE: ICD-10-CM

## 2022-03-14 DIAGNOSIS — N80.9 ENDOMETRIOSIS: ICD-10-CM

## 2022-03-14 DIAGNOSIS — Z12.4 ENCOUNTER FOR PAPANICOLAOU SMEAR FOR CERVICAL CANCER SCREENING: ICD-10-CM

## 2022-03-14 DIAGNOSIS — Z01.419 ENCOUNTER FOR WELL WOMAN EXAM WITH ROUTINE GYNECOLOGICAL EXAM: Primary | ICD-10-CM

## 2022-03-14 PROCEDURE — 99395 PREV VISIT EST AGE 18-39: CPT | Performed by: NURSE PRACTITIONER

## 2022-03-14 PROCEDURE — 87491 CHLMYD TRACH DNA AMP PROBE: CPT | Performed by: NURSE PRACTITIONER

## 2022-03-14 PROCEDURE — 84403 ASSAY OF TOTAL TESTOSTERONE: CPT | Performed by: NURSE PRACTITIONER

## 2022-03-14 PROCEDURE — 82607 VITAMIN B-12: CPT | Performed by: STUDENT IN AN ORGANIZED HEALTH CARE EDUCATION/TRAINING PROGRAM

## 2022-03-14 PROCEDURE — 83498 ASY HYDROXYPROGESTERONE 17-D: CPT | Performed by: NURSE PRACTITIONER

## 2022-03-14 PROCEDURE — 86803 HEPATITIS C AB TEST: CPT | Performed by: STUDENT IN AN ORGANIZED HEALTH CARE EDUCATION/TRAINING PROGRAM

## 2022-03-14 PROCEDURE — 87661 TRICHOMONAS VAGINALIS AMPLIF: CPT | Performed by: NURSE PRACTITIONER

## 2022-03-14 PROCEDURE — 84439 ASSAY OF FREE THYROXINE: CPT | Performed by: STUDENT IN AN ORGANIZED HEALTH CARE EDUCATION/TRAINING PROGRAM

## 2022-03-14 PROCEDURE — 82306 VITAMIN D 25 HYDROXY: CPT | Performed by: STUDENT IN AN ORGANIZED HEALTH CARE EDUCATION/TRAINING PROGRAM

## 2022-03-14 PROCEDURE — 2014F MENTAL STATUS ASSESS: CPT | Performed by: NURSE PRACTITIONER

## 2022-03-14 PROCEDURE — 84146 ASSAY OF PROLACTIN: CPT | Performed by: NURSE PRACTITIONER

## 2022-03-14 PROCEDURE — 84443 ASSAY THYROID STIM HORMONE: CPT | Performed by: STUDENT IN AN ORGANIZED HEALTH CARE EDUCATION/TRAINING PROGRAM

## 2022-03-14 PROCEDURE — 82627 DEHYDROEPIANDROSTERONE: CPT | Performed by: NURSE PRACTITIONER

## 2022-03-14 PROCEDURE — 80048 BASIC METABOLIC PNL TOTAL CA: CPT | Performed by: STUDENT IN AN ORGANIZED HEALTH CARE EDUCATION/TRAINING PROGRAM

## 2022-03-14 PROCEDURE — 81001 URINALYSIS AUTO W/SCOPE: CPT | Performed by: STUDENT IN AN ORGANIZED HEALTH CARE EDUCATION/TRAINING PROGRAM

## 2022-03-14 PROCEDURE — 84402 ASSAY OF FREE TESTOSTERONE: CPT | Performed by: NURSE PRACTITIONER

## 2022-03-14 PROCEDURE — 87591 N.GONORRHOEAE DNA AMP PROB: CPT | Performed by: NURSE PRACTITIONER

## 2022-03-14 PROCEDURE — 84270 ASSAY OF SEX HORMONE GLOBUL: CPT | Performed by: NURSE PRACTITIONER

## 2022-03-14 RX ORDER — DROSPIRENONE AND ETHINYL ESTRADIOL 0.02-3(28)
1 KIT ORAL DAILY
Qty: 28 TABLET | Refills: 12 | Status: SHIPPED | OUTPATIENT
Start: 2022-03-14 | End: 2022-05-24

## 2022-03-14 NOTE — PROGRESS NOTES
"Subjective   Sherrie Smith is a 32 y.o. here for pelvic pain, acne, gyn annual    Sherrie Smith is a 32 yr old . Referred by PCP. C/o of left side \"ovary\" pain for the last 3 months. Pain is achy, sometimes sharp. Yesterday, she experience a pain on her left side as if she is about to start her period, however, LMP .  Endorses a known history of endometriosis, not on birth control. C/o hx of irregular periods but periods have occurred every month for the las 3 months. Also, c/ of feeling hormonal and has bothersome new acne. Would also like to lose weight. BMI 46.  Hx of abnormal pap in , repeat pap was normal.       The following portions of the patient's history were reviewed and updated as appropriate: allergies, current medications, past family history, past medical history, past social history, past surgical history and problem list.    Review of Systems   Constitutional: Positive for unexpected weight gain. Negative for chills, fatigue, fever and unexpected weight loss.   Respiratory: Negative for shortness of breath.    Cardiovascular: Negative for chest pain and palpitations.   Gastrointestinal: Negative for abdominal pain, constipation, diarrhea and nausea.   Endocrine: Negative for cold intolerance and heat intolerance.   Genitourinary: Positive for pelvic pain. Negative for amenorrhea, breast discharge, breast lump, breast pain, difficulty urinating, dysuria, frequency, menstrual problem, pelvic pressure, urinary incontinence, vaginal bleeding, vaginal discharge and vaginal pain.   Musculoskeletal:        Acne   Skin: Negative for rash.   Neurological: Negative for headache.   Psychiatric/Behavioral: Negative for sleep disturbance, depressed mood and stress.       Objective   Physical Exam  Vitals and nursing note reviewed. Exam conducted with a chaperone present.   Constitutional:       Appearance: She is well-developed.   HENT:      Head: Normocephalic and atraumatic. "   Neck:      Thyroid: No thyromegaly.   Cardiovascular:      Rate and Rhythm: Normal rate and regular rhythm.      Heart sounds: Normal heart sounds.   Pulmonary:      Effort: Pulmonary effort is normal. No respiratory distress.      Breath sounds: Normal breath sounds.   Chest:   Breasts:      Right: Normal.      Left: Normal.       Abdominal:      General: There is no distension.      Palpations: Abdomen is soft.      Tenderness: There is no abdominal tenderness.   Genitourinary:     General: Normal vulva.      Vagina: Normal.      Cervix: Normal.      Uterus: Tender.       Adnexa: Right adnexa normal and left adnexa normal.      Rectum: Normal.      Comments: Pap cotesting and G/C swab collected.   Musculoskeletal:         General: Normal range of motion.      Cervical back: Normal range of motion and neck supple.   Skin:     General: Skin is warm and dry.   Neurological:      Mental Status: She is alert and oriented to person, place, and time.   Psychiatric:         Behavior: Behavior normal.           Assessment/Plan   Diagnoses and all orders for this visit:    1. Encounter for well woman exam with routine gynecological exam (Primary)    2. Pelvic pain  -     US Non-ob Transvaginal; Future    3. Encounter for Papanicolaou smear for cervical cancer screening  -     Liquid-based Pap Smear, Screening; Future  -     Liquid-based Pap Smear, Screening    4. Routine screening for STI (sexually transmitted infection)  -     Chlamydia trachomatis, Neisseria gonorrhoeae, Trichomonas vaginalis, PCR - Swab, Cervix    5. Acne, unspecified acne type  -     Testosterone (Free & Total), LC / MS  -     Cancel: Testosterone, Free, Total  -     Sex Horm Binding Globulin  -     Prolactin  -     DHEA-Sulfate  -     17-Hydroxyprogesterone    6. Endometriosis    Other orders  -     drospirenone-ethinyl estradiol (GRIFFIN) 3-0.02 MG per tablet; Take 1 tablet by mouth Daily.  Dispense: 28 tablet; Refill: 12        Reviewed pap smear  screening, breast awareness, and mammogram starting at age 40. Discussed BC and Lupron for endometriosis management; pt agreeable to trying BC pills at this time.  Also,discussed that BC pills can help with acne. Will further evaluate pelvic pain with US but discuss it is likely attributable to endometriosis. Discussed that the pain she left yesterday may have  been ovulation pain as she is at her fertile window. Encourage diet and exercise; f/u with PCP for further weight management. Return in 1 year of as needed.

## 2022-03-15 LAB
25(OH)D3 SERPL-MCNC: 30.5 NG/ML (ref 30–100)
ANION GAP SERPL CALCULATED.3IONS-SCNC: 12.9 MMOL/L (ref 5–15)
BACTERIA UR QL AUTO: ABNORMAL /HPF
BILIRUB UR QL STRIP: NEGATIVE
BUN SERPL-MCNC: 11 MG/DL (ref 6–20)
BUN/CREAT SERPL: 11 (ref 7–25)
CALCIUM SPEC-SCNC: 9.4 MG/DL (ref 8.6–10.5)
CHLORIDE SERPL-SCNC: 105 MMOL/L (ref 98–107)
CLARITY UR: CLEAR
CO2 SERPL-SCNC: 23.1 MMOL/L (ref 22–29)
COLOR UR: YELLOW
CREAT SERPL-MCNC: 1 MG/DL (ref 0.57–1)
EGFRCR SERPLBLD CKD-EPI 2021: 76.4 ML/MIN/1.73
GLUCOSE SERPL-MCNC: 88 MG/DL (ref 65–99)
GLUCOSE UR STRIP-MCNC: NEGATIVE MG/DL
HGB UR QL STRIP.AUTO: NEGATIVE
HYALINE CASTS UR QL AUTO: ABNORMAL /LPF
KETONES UR QL STRIP: NEGATIVE
LEUKOCYTE ESTERASE UR QL STRIP.AUTO: NEGATIVE
NITRITE UR QL STRIP: NEGATIVE
PH UR STRIP.AUTO: 7 [PH] (ref 5–8)
POTASSIUM SERPL-SCNC: 4.3 MMOL/L (ref 3.5–5.2)
PROLACTIN SERPL-MCNC: 8.02 NG/ML (ref 4.79–23.3)
PROT UR QL STRIP: ABNORMAL
RBC # UR STRIP: ABNORMAL /HPF
REF LAB TEST METHOD: ABNORMAL
SODIUM SERPL-SCNC: 141 MMOL/L (ref 136–145)
SP GR UR STRIP: 1.03 (ref 1–1.03)
SQUAMOUS #/AREA URNS HPF: ABNORMAL /HPF
T4 FREE SERPL-MCNC: 1.43 NG/DL (ref 0.93–1.7)
TSH SERPL DL<=0.05 MIU/L-ACNC: 11.7 UIU/ML (ref 0.27–4.2)
UROBILINOGEN UR QL STRIP: ABNORMAL
VIT B12 BLD-MCNC: 521 PG/ML (ref 211–946)
WBC # UR STRIP: ABNORMAL /HPF

## 2022-03-16 LAB
C TRACH RRNA CVX QL NAA+PROBE: NEGATIVE
DHEA-S SERPL-MCNC: 69.8 UG/DL (ref 84.8–378)
HCV AB S/CO SERPL IA: <0.1 S/CO RATIO (ref 0–0.9)
HCV AB SERPL QL IA: NORMAL
N GONORRHOEA RRNA SPEC QL NAA+PROBE: NEGATIVE
SHBG SERPL-SCNC: 25 NMOL/L (ref 24.6–122)
TRICHOMONAS VAGINALIS PCR: NEGATIVE

## 2022-03-17 PROBLEM — N80.9 ENDOMETRIOSIS: Status: ACTIVE | Noted: 2022-03-17

## 2022-03-20 LAB — 17OHP SERPL-MCNC: 42 NG/DL

## 2022-03-22 DIAGNOSIS — L70.9 ACNE, UNSPECIFIED ACNE TYPE: ICD-10-CM

## 2022-03-22 DIAGNOSIS — R89.1 ABNORMAL LEVEL OF HORMONES IN SPECIMENS FROM OTHER ORGANS, SYSTEMS AND TISSUES: Primary | ICD-10-CM

## 2022-03-22 LAB
TESTOST FREE SERPL-MCNC: 1.8 PG/ML (ref 0–4.2)
TESTOST SERPL-MCNC: 32.2 NG/DL (ref 10–55)

## 2022-03-23 LAB
LAB AP CASE REPORT: NORMAL
LAB AP GYN HPV REPORT,ADDENDUM: NORMAL
PATH INTERP SPEC-IMP: NORMAL

## 2022-04-01 DIAGNOSIS — R10.2 PELVIC PAIN: ICD-10-CM

## 2022-04-06 ENCOUNTER — DOCUMENTATION (OUTPATIENT)
Dept: PHYSICAL THERAPY | Facility: HOSPITAL | Age: 32
End: 2022-04-06

## 2022-04-06 NOTE — THERAPY DISCHARGE NOTE
Outpatient Physical Therapy Ortho /Discharge Summary       Patient Name: Sherrie Smith  : 1990  MRN: [id number]  Today's Date: 2022      Visit Date: 2022    Visit Dx:  No diagnosis found.    Patient Active Problem List   Diagnosis   • Hypothyroidism associated with surgical procedure   • Hypothyroidism   • Endometriosis        Past Medical History:   Diagnosis Date   • Bunion    • Endometriosis    • Family history of thyroid problem    • Hammer toe    • Hyperlipidemia    • Hypothyroidism    • Migraine         Past Surgical History:   Procedure Laterality Date   • BUNIONECTOMY     • LAPAROSCOPY DIAGNOSTIC / BIOPSY / ASPIRATION / LYSIS     • THYROIDECTOMY                                                     PT OP Goals     Row Name 22 1400          PT Short Term Goals    STG 1 Patient to be molded with B orthotics.  -     STG 1 Progress Met  -     STG 2 Patient to be fit with B orthotics  -     STG 2 Progress Met  -     STG 3 Patient to show understanding of proper shoe wear.  -     STG 3 Progress Met  -     STG 4 Patient to show understanding of orthotic use and care.  -     STG 4 Progress Met  -     STG 5 Patient to show understanding of orthotic wear.  -     STG 5 Progress Met  -            Long Term Goals    LTG 1 Patient to return for orthotic adjustments as needed.  -     LTG 1 Progress Ongoing  -           User Key  (r) = Recorded By, (t) = Taken By, (c) = Cosigned By    Initials Name Provider Type    Brianda Craig PTA Physical Therapist Assistant                                            OP PT Discharge Summary  Date of Discharge: 22  Reason for Discharge: All goals achieved  Outcomes Achieved: Refer to plan of care for updates on goals achieved  Discharge Destination: Home without follow-up      Brianda Fonseca PTA  2022

## 2022-05-17 ENCOUNTER — PATIENT MESSAGE (OUTPATIENT)
Dept: FAMILY MEDICINE CLINIC | Facility: CLINIC | Age: 32
End: 2022-05-17

## 2022-05-24 ENCOUNTER — LAB (OUTPATIENT)
Dept: LAB | Facility: HOSPITAL | Age: 32
End: 2022-05-24

## 2022-05-24 ENCOUNTER — OFFICE VISIT (OUTPATIENT)
Dept: FAMILY MEDICINE CLINIC | Facility: CLINIC | Age: 32
End: 2022-05-24

## 2022-05-24 VITALS
OXYGEN SATURATION: 98 % | WEIGHT: 293 LBS | SYSTOLIC BLOOD PRESSURE: 140 MMHG | DIASTOLIC BLOOD PRESSURE: 86 MMHG | HEIGHT: 66 IN | TEMPERATURE: 97.3 F | HEART RATE: 77 BPM | BODY MASS INDEX: 47.09 KG/M2

## 2022-05-24 DIAGNOSIS — E53.8 B12 DEFICIENCY: ICD-10-CM

## 2022-05-24 DIAGNOSIS — E55.9 VITAMIN D INSUFFICIENCY: ICD-10-CM

## 2022-05-24 DIAGNOSIS — E66.01 CLASS 3 SEVERE OBESITY WITHOUT SERIOUS COMORBIDITY WITH BODY MASS INDEX (BMI) OF 45.0 TO 49.9 IN ADULT, UNSPECIFIED OBESITY TYPE: ICD-10-CM

## 2022-05-24 DIAGNOSIS — L70.9 ACNE, UNSPECIFIED ACNE TYPE: ICD-10-CM

## 2022-05-24 DIAGNOSIS — E89.0 HYPOTHYROIDISM ASSOCIATED WITH SURGICAL PROCEDURE: Primary | ICD-10-CM

## 2022-05-24 DIAGNOSIS — R89.1 ABNORMAL LEVEL OF HORMONES IN SPECIMENS FROM OTHER ORGANS, SYSTEMS AND TISSUES: ICD-10-CM

## 2022-05-24 DIAGNOSIS — M79.89 SWELLING OF LOWER EXTREMITY: ICD-10-CM

## 2022-05-24 LAB
T4 FREE SERPL-MCNC: 0.82 NG/DL (ref 0.93–1.7)
TSH SERPL DL<=0.05 MIU/L-ACNC: 21.4 UIU/ML (ref 0.27–4.2)

## 2022-05-24 PROCEDURE — 84439 ASSAY OF FREE THYROXINE: CPT | Performed by: STUDENT IN AN ORGANIZED HEALTH CARE EDUCATION/TRAINING PROGRAM

## 2022-05-24 PROCEDURE — 84443 ASSAY THYROID STIM HORMONE: CPT | Performed by: STUDENT IN AN ORGANIZED HEALTH CARE EDUCATION/TRAINING PROGRAM

## 2022-05-24 PROCEDURE — 82627 DEHYDROEPIANDROSTERONE: CPT

## 2022-05-24 PROCEDURE — 99214 OFFICE O/P EST MOD 30 MIN: CPT | Performed by: STUDENT IN AN ORGANIZED HEALTH CARE EDUCATION/TRAINING PROGRAM

## 2022-05-24 RX ORDER — CHOLECALCIFEROL (VITAMIN D3) 25 MCG
2000 CAPSULE ORAL DAILY
Qty: 60 CAPSULE | Refills: 5 | Status: SHIPPED | OUTPATIENT
Start: 2022-05-24 | End: 2022-08-16 | Stop reason: SDUPTHER

## 2022-05-24 RX ORDER — VALACYCLOVIR HYDROCHLORIDE 500 MG/1
1 TABLET, FILM COATED ORAL AS NEEDED
COMMUNITY
Start: 2022-04-19 | End: 2022-08-04 | Stop reason: SDUPTHER

## 2022-05-24 NOTE — PROGRESS NOTES
"Subjective:  Sherrie Smith is a 32 y.o. female who presents for     Hypothyroidism; on previous labs, TSH 11.7, and free T4 1.43.  Patient states has been working on adherence to medication, currently on Synthroid 175 mcg daily, denies adverse effects of medication.  Does admit to increased weight gain, fatigue but denied cold intolerance, hair loss, constipation, myalgia, palpitations.    Lower extremity swelling; patient states chronic in nature, has tried multiple over-the-counter compression stockings but none for that, recently received compression stockings from father that are 15 to 20 mmHg compression.  States helps with swelling but would like to know why her legs are swelling.  Denied acute changes, redness, rash, erythema, tenderness.  Denied any chest pain, palpitations, orthopnea, dyspnea on exertion.  Does have Lasix 20 mg daily as needed, does not take medication often due to concern for hypokalemia.    Obesity; patient states tried phentermine, did not have any adverse effects medication but states did not have any successful weight loss.  Interested in bariatric surgery at this time.    Patient Active Problem List   Diagnosis   • Hypothyroidism associated with surgical procedure   • Hypothyroidism   • Endometriosis     Vitals:    Vitals:    05/24/22 0814   BP: 140/86   BP Location: Right arm   Patient Position: Sitting   Cuff Size: Large Adult   Pulse: 77   Temp: 97.3 °F (36.3 °C)   SpO2: 98%   Weight: (!) 137 kg (301 lb)   Height: 167.6 cm (66\")     Body mass index is 48.58 kg/m².      Current Outpatient Medications:   •  Cholecalciferol (Vitamin D-3) 25 MCG (1000 UT) capsule, Take 2,000 Units by mouth Daily., Disp: 60 capsule, Rfl: 5  •  Cyanocobalamin 1000 MCG/ML kit, Administer injection IM or SQ 3 times weekly x 2 weeks and then once monthly injections, Disp: 1 kit, Rfl: 11  •  Linzess 145 MCG capsule capsule, , Disp: , Rfl:   •  multivitamin with minerals tablet tablet, Take 1 tablet by " mouth Daily., Disp: , Rfl:   •  naproxen (NAPROSYN) 500 MG tablet, , Disp: , Rfl:   •  Omega-3 Fatty Acids (fish oil) 1000 MG capsule capsule, Take 2 capsules by mouth Daily With Breakfast., Disp: 30 capsule, Rfl: 5  •  Synthroid 175 MCG tablet, Take 1 tablet by mouth Daily., Disp: 90 tablet, Rfl: 1  •  valACYclovir (VALTREX) 500 MG tablet, Take 1 tablet by mouth As Needed for Other. Use when needed for breakouts, Disp: , Rfl:   •  drospirenone-ethinyl estradiol (GRIFFIN) 3-0.02 MG per tablet, Take 1 tablet by mouth Daily., Disp: 28 tablet, Rfl: 12  •  furosemide (LASIX) 20 MG tablet, furosemide 20 mg tablet  TAKE ONE TABLET AS NEEDED FOR swelling as needed but not more THAN ONCE PER DAY, Disp: , Rfl:     Patient Active Problem List   Diagnosis   • Hypothyroidism associated with surgical procedure   • Hypothyroidism   • Endometriosis     Past Surgical History:   Procedure Laterality Date   • BUNIONECTOMY     • LAPAROSCOPY DIAGNOSTIC / BIOPSY / ASPIRATION / LYSIS     • THYROIDECTOMY       Social History     Socioeconomic History   • Marital status: Single   Tobacco Use   • Smoking status: Former Smoker     Types: Cigarettes   • Smokeless tobacco: Never Used   • Tobacco comment: a couple a week   Vaping Use   • Vaping Use: Never used   Substance and Sexual Activity   • Alcohol use: Never   • Drug use: Never   • Sexual activity: Yes     Partners: Male     Birth control/protection: Condom     Family History   Problem Relation Age of Onset   • Hyperlipidemia Mother    • Thyroid disease Mother    • Atrial fibrillation Father    • Heart disease Father    • Prostate cancer Father    • Thyroid disease Maternal Grandmother    • Melanoma Paternal Grandfather      Office Visit on 03/14/2022   Component Date Value Ref Range Status   • Chlamydia DNA by PCR 03/14/2022 Negative  Negative Final   • Neisseria gonorrhoeae by PCR 03/14/2022 Negative  Negative Final   • Trichomonas vaginalis PCR 03/14/2022 Negative  Negative, Invalid Final    • Testosterone, Total 03/14/2022 32.2  10.0 - 55.0 ng/dL Final   • Testosterone, Free 03/14/2022 1.8  0.0 - 4.2 pg/mL Final   • Sex Hormone Binding Globulin 03/14/2022 25.0  24.6 - 122.0 nmol/L Final   • Prolactin 03/14/2022 8.02  4.79 - 23.30 ng/mL Final   • DHEA-Sulfate 03/14/2022 69.8 (A) 84.8 - 378.0 ug/dL Final   • 17-Hydroxyprogesterone 03/14/2022 42  ng/dL Final                              Adult Female                             Follicular        15 -  70                             Luteal            35 - 290   • Case Report 03/14/2022    Final                    Value:Gynecologic Cytology Report                       Case: [id number]                                  Authorizing Provider:  Nicole Shrestha APRN   Collected:           03/14/2022 03:27 PM          Ordering Location:     [facility]   Received:            03/14/2022 04:10 PM                                 [facility]                                                         First Screen:          Luis E Felton                                                             Specimen:    Sendout to [facility], Cervix                                                                    • HPV Report, Addendum 03/14/2022    Final                    Value:This result contains rich text formatting which cannot be displayed here.   • Interpretation 03/14/2022 See attached report    Final   Office Visit on 02/24/2022   Component Date Value Ref Range Status   • Glucose 03/14/2022 88  65 - 99 mg/dL Final   • BUN 03/14/2022 11  6 - 20 mg/dL Final   • Creatinine 03/14/2022 1.00  0.57 - 1.00 mg/dL Final   • Sodium 03/14/2022 141  136 - 145 mmol/L Final   • Potassium 03/14/2022 4.3  3.5 - 5.2 mmol/L Final   • Chloride 03/14/2022 105  98 - 107 mmol/L Final   • CO2 03/14/2022 23.1  22.0 - 29.0 mmol/L Final   • Calcium 03/14/2022 9.4  8.6 - 10.5 mg/dL Final   • BUN/Creatinine Ratio 03/14/2022 11.0  7.0 - 25.0 Final   • Anion Gap 03/14/2022 12.9   5.0 - 15.0 mmol/L Final   • eGFR 03/14/2022 76.4  >60.0 mL/min/1.73 Final    National Kidney Foundation and American Society of Nephrology (ASN) Task Force recommended calculation based on the Chronic Kidney Disease Epidemiology Collaboration (CKD-EPI) equation refit without adjustment for race.   • TSH 03/14/2022 11.700 (A) 0.270 - 4.200 uIU/mL Final   • 25 Hydroxy, Vitamin D 03/14/2022 30.5  30.0 - 100.0 ng/ml Final   • Vitamin B-12 03/14/2022 521  211 - 946 pg/mL Final   • Hepatitis C Ab 03/14/2022 <0.1  0.0 - 0.9 s/co ratio Final   • Color, UA 03/14/2022 Yellow  Yellow, Straw Final   • Appearance, UA 03/14/2022 Clear  Clear Final   • pH, UA 03/14/2022 7.0  5.0 - 8.0 Final   • Specific Gravity, UA 03/14/2022 1.026  1.005 - 1.030 Final   • Glucose, UA 03/14/2022 Negative  Negative Final   • Ketones, UA 03/14/2022 Negative  Negative Final   • Bilirubin, UA 03/14/2022 Negative  Negative Final   • Blood, UA 03/14/2022 Negative  Negative Final   • Protein, UA 03/14/2022 Trace (A) Negative Final   • Leuk Esterase, UA 03/14/2022 Negative  Negative Final   • Nitrite, UA 03/14/2022 Negative  Negative Final   • Urobilinogen, UA 03/14/2022 1.0 E.U./dL  0.2 - 1.0 E.U./dL Final   • RBC, UA 03/14/2022 None Seen  None Seen, 0-2 /HPF Final   • WBC, UA 03/14/2022 0-2  None Seen, 0-2 /HPF Final   • Bacteria, UA 03/14/2022 None Seen  None Seen /HPF Final   • Squamous Epithelial Cells, UA 03/14/2022 3-6 (A) None Seen, 0-2 /HPF Final   • Hyaline Casts, UA 03/14/2022 None Seen  None Seen /LPF Final   • Methodology 03/14/2022 Manual Light Microscopy   Final   • Free T4 03/14/2022 1.43  0.93 - 1.70 ng/dL Final   • Interpretation 03/14/2022 Comment   Final    Negative  Not infected with HCV, unless recent infection is suspected or other  evidence exists to indicate HCV infection.      XR foot 3+ vw bilateral  Narrative: PROCEDURE: 3 views of the right left foot    COMPARISON: No comparison     HISTORY: Foot pain    FINDINGS: 3  weightbearing views of the right and left foot reviewed.  No   fracture or dislocation.  No osseous erosion or suspicious osseous lesion.    Normal bone mineral density.  No increased soft tissue density.  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  Impression:  Negative for acute osseous or articular abnormality.         [unfilled]  Immunization History   Administered Date(s) Administered   • COVID-19 (MODERNA) 1st, 2nd, 3rd Dose Only 11/28/2021, 03/14/2022   • DTP 1989, 1989, 1989, 07/22/1992, 08/06/1993   • Hep B, Adolescent or Pediatric 10/02/2006   • HiB 07/22/1992, 08/06/1993   • MMR 05/11/1990, 08/30/1994   • OPV 1989, 1989, 1989, 08/06/1993   • Td 10/02/2006   • Tdap 09/19/2016     The following portions of the patient's history were reviewed and updated as appropriate: allergies, current medications, past family history, past medical history, past social history, past surgical history and problem list.    PHQ-9 Total Score: 0         Physical Exam  Constitutional:       Appearance: Normal appearance. She is obese.   HENT:      Head: Normocephalic and atraumatic.      Right Ear: External ear normal.      Left Ear: External ear normal.   Eyes:      General:         Right eye: No discharge.         Left eye: No discharge.      Conjunctiva/sclera: Conjunctivae normal.   Cardiovascular:      Rate and Rhythm: Normal rate and regular rhythm.      Pulses: Normal pulses.      Heart sounds: Normal heart sounds. No murmur heard.  Pulmonary:      Effort: Pulmonary effort is normal. No respiratory distress.      Breath sounds: Normal breath sounds.   Abdominal:      General: There is no distension.      Palpations: Abdomen is soft.      Tenderness: There is no abdominal tenderness.   Musculoskeletal:      Cervical back: Normal range of motion.      Right lower leg: No edema.      Left lower leg: No edema.   Lymphadenopathy:      Cervical: No cervical adenopathy.    Neurological:      Mental Status: She is alert. Mental status is at baseline.   Psychiatric:         Mood and Affect: Mood normal.         Behavior: Behavior normal.         Assessment & Plan    Diagnosis Plan   1. Hypothyroidism associated with surgical procedure  TSH Rfx On Abnormal To Free T4   2. Swelling of lower extremity  Duplex Venous Lower Extremity - Bilateral CAR    Compression Stockings   3. B12 deficiency  Cyanocobalamin 1000 MCG/ML kit   4. Vitamin D insufficiency  Cholecalciferol (Vitamin D-3) 25 MCG (1000 UT) capsule   5. Class 3 severe obesity without serious comorbidity with body mass index (BMI) of 45.0 to 49.9 in adult, unspecified obesity type (HCC)        Orders Placed This Encounter   Procedures   • Compression Stockings     15-20mmHg     Order Specific Question:   Length of Need (99 Months = Lifetime)     Answer:   99 Months = Lifetime   • TSH Rfx On Abnormal To Free T4     Order Specific Question:   Release to patient     Answer:   Immediate     Lower extremity swelling; will obtain duplex ultrasound, continue with conservative management, compression stockings.    Obesity; patient interested in bariatric surgery, but would like to look up providers and call clinic with preference.  After preference received, will call in referral.  BMI greater than 40, benefits of surgery outweigh risks.    B12/vitamin D insufficiency; needs refills as above, will refill.    Hypothyroidism; patient previously uncontrolled, likely due to nonadherence of medication, advised on importance, will recheck levels and adjust medication as needed.  Follow-up in 6 weeks or sooner if needed.        This document has been electronically signed by James Angulo MD on May 24, 2022 09:23 CDT    EMR Dragon/Transciption Disclaimer: Some of this note may be an electronic transcription/translation of spoken language to printed text.  The electronic translation of spoken language may permit erroneous, or at times, nonsensical  words or phrases to be inadvertently transcribed. Although I have reviewed the note for such errors, some may still exist.

## 2022-05-25 ENCOUNTER — TELEPHONE (OUTPATIENT)
Dept: FAMILY MEDICINE CLINIC | Facility: CLINIC | Age: 32
End: 2022-05-25

## 2022-05-25 DIAGNOSIS — R89.1 ABNORMAL LEVEL OF HORMONES IN SPECIMENS FROM OTHER ORGANS, SYSTEMS AND TISSUES: Primary | ICD-10-CM

## 2022-05-25 DIAGNOSIS — E89.0 HYPOTHYROIDISM ASSOCIATED WITH SURGICAL PROCEDURE: ICD-10-CM

## 2022-05-25 DIAGNOSIS — E89.0 HYPOTHYROIDISM ASSOCIATED WITH SURGICAL PROCEDURE: Primary | ICD-10-CM

## 2022-05-25 LAB — DHEA-S SERPL-MCNC: 44.6 UG/DL (ref 84.8–378)

## 2022-05-25 RX ORDER — LEVOTHYROXINE SODIUM 0.2 MG/1
200 TABLET ORAL DAILY
Qty: 60 TABLET | Refills: 0 | Status: SHIPPED | OUTPATIENT
Start: 2022-05-25 | End: 2022-05-25

## 2022-05-25 RX ORDER — LEVOTHYROXINE SODIUM 200 MCG
200 TABLET ORAL DAILY
Qty: 30 TABLET | Refills: 1 | Status: SHIPPED | OUTPATIENT
Start: 2022-05-25 | End: 2022-07-12

## 2022-05-26 ENCOUNTER — TELEPHONE (OUTPATIENT)
Dept: FAMILY MEDICINE CLINIC | Facility: CLINIC | Age: 32
End: 2022-05-26

## 2022-05-31 ENCOUNTER — TELEPHONE (OUTPATIENT)
Dept: FAMILY MEDICINE CLINIC | Facility: CLINIC | Age: 32
End: 2022-05-31

## 2022-05-31 NOTE — TELEPHONE ENCOUNTER
Sent in PA for Synthroid through [employer]      Approved from 5/31/2022 - 5/30/2023 for 12 fills    Faxed to pharmacy.

## 2022-07-11 DIAGNOSIS — E89.0 HYPOTHYROIDISM ASSOCIATED WITH SURGICAL PROCEDURE: ICD-10-CM

## 2022-07-12 RX ORDER — LEVOTHYROXINE SODIUM 200 MCG
TABLET ORAL
Qty: 30 TABLET | Refills: 0 | Status: SHIPPED | OUTPATIENT
Start: 2022-07-12 | End: 2022-08-16 | Stop reason: SDUPTHER

## 2022-08-04 ENCOUNTER — OFFICE VISIT (OUTPATIENT)
Dept: FAMILY MEDICINE CLINIC | Facility: CLINIC | Age: 32
End: 2022-08-04

## 2022-08-04 VITALS
OXYGEN SATURATION: 98 % | BODY MASS INDEX: 47.09 KG/M2 | DIASTOLIC BLOOD PRESSURE: 86 MMHG | TEMPERATURE: 96.9 F | SYSTOLIC BLOOD PRESSURE: 128 MMHG | HEART RATE: 78 BPM | WEIGHT: 293 LBS | HEIGHT: 66 IN

## 2022-08-04 DIAGNOSIS — E89.0 HYPOTHYROIDISM ASSOCIATED WITH SURGICAL PROCEDURE: Primary | ICD-10-CM

## 2022-08-04 DIAGNOSIS — A60.04 HERPES SIMPLEX VULVOVAGINITIS: ICD-10-CM

## 2022-08-04 DIAGNOSIS — E66.01 CLASS 3 SEVERE OBESITY WITH SERIOUS COMORBIDITY AND BODY MASS INDEX (BMI) OF 45.0 TO 49.9 IN ADULT, UNSPECIFIED OBESITY TYPE: ICD-10-CM

## 2022-08-04 DIAGNOSIS — R51.9 NEW ONSET HEADACHE: ICD-10-CM

## 2022-08-04 PROBLEM — E04.0 DIFFUSE THYROID GOITER WITHOUT THYROTOXICOSIS: Status: ACTIVE | Noted: 2020-11-11

## 2022-08-04 PROCEDURE — 99214 OFFICE O/P EST MOD 30 MIN: CPT | Performed by: STUDENT IN AN ORGANIZED HEALTH CARE EDUCATION/TRAINING PROGRAM

## 2022-08-04 RX ORDER — VALACYCLOVIR HYDROCHLORIDE 500 MG/1
TABLET, FILM COATED ORAL
Qty: 30 TABLET | Refills: 3 | Status: SHIPPED | OUTPATIENT
Start: 2022-08-04

## 2022-08-04 RX ORDER — SUMATRIPTAN 50 MG/1
50 TABLET, FILM COATED ORAL ONCE AS NEEDED
Qty: 10 TABLET | Refills: 2 | Status: SHIPPED | OUTPATIENT
Start: 2022-08-04

## 2022-08-04 NOTE — PATIENT INSTRUCTIONS
Migraine Headache  A migraine headache is a very strong throbbing pain on one side or both sides of your head. This type of headache can also cause other symptoms. It can last from 4 hours to 3 days. Talk with your doctor about what things may bring on (trigger) this condition.  What are the causes?  The exact cause of this condition is not known. This condition may be triggered or caused by:  Drinking alcohol.  Smoking.  Taking medicines, such as:  Medicine used to treat chest pain (nitroglycerin).  Birth control pills.  Estrogen.  Some blood pressure medicines.  Eating or drinking certain products.  Doing physical activity.  Other things that may trigger a migraine headache include:  Having a menstrual period.  Pregnancy.  Hunger.  Stress.  Not getting enough sleep or getting too much sleep.  Weather changes.  Tiredness (fatigue).  What increases the risk?  Being 25-55 years old.  Being female.  Having a family history of migraine headaches.  Being .  Having depression or anxiety.  Being very overweight.  What are the signs or symptoms?  A throbbing pain. This pain may:  Happen in any area of the head, such as on one side or both sides.  Make it hard to do daily activities.  Get worse with physical activity.  Get worse around bright lights or loud noises.  Other symptoms may include:  Feeling sick to your stomach (nauseous).  Vomiting.  Dizziness.  Being sensitive to bright lights, loud noises, or smells.  Before you get a migraine headache, you may get warning signs (an aura). An aura may include:  Seeing flashing lights or having blind spots.  Seeing bright spots, halos, or zigzag lines.  Having tunnel vision or blurred vision.  Having numbness or a tingling feeling.  Having trouble talking.  Having weak muscles.  Some people have symptoms after a migraine headache (postdromal phase), such as:  Tiredness.  Trouble thinking (concentrating).  How is this treated?  Taking medicines that:  Relieve  pain.  Relieve the feeling of being sick to your stomach.  Prevent migraine headaches.  Treatment may also include:  Having acupuncture.  Avoiding foods that bring on migraine headaches.  Learning ways to control your body functions (biofeedback).  Therapy to help you know and deal with negative thoughts (cognitive behavioral therapy).  Follow these instructions at home:  Medicines  Take over-the-counter and prescription medicines only as told by your doctor.  Ask your doctor if the medicine prescribed to you:  Requires you to avoid driving or using heavy machinery.  Can cause trouble pooping (constipation). You may need to take these steps to prevent or treat trouble pooping:  Drink enough fluid to keep your pee (urine) pale yellow.  Take over-the-counter or prescription medicines.  Eat foods that are high in fiber. These include beans, whole grains, and fresh fruits and vegetables.  Limit foods that are high in fat and sugar. These include fried or sweet foods.  Lifestyle  Do not drink alcohol.  Do not use any products that contain nicotine or tobacco, such as cigarettes, e-cigarettes, and chewing tobacco. If you need help quitting, ask your doctor.  Get at least 8 hours of sleep every night.  Limit and deal with stress.  General instructions         Keep a journal to find out what may bring on your migraine headaches. For example, write down:  What you eat and drink.  How much sleep you get.  Any change in what you eat or drink.  Any change in your medicines.  If you have a migraine headache:  Avoid things that make your symptoms worse, such as bright lights.  It may help to lie down in a dark, quiet room.  Do not drive or use heavy machinery.  Ask your doctor what activities are safe for you.  Keep all follow-up visits as told by your doctor. This is important.  Contact a doctor if:  You get a migraine headache that is different or worse than others you have had.  You have more than 15 headache days in one  month.  Get help right away if:  Your migraine headache gets very bad.  Your migraine headache lasts longer than 72 hours.  You have a fever.  You have a stiff neck.  You have trouble seeing.  Your muscles feel weak or like you cannot control them.  You start to lose your balance a lot.  You start to have trouble walking.  You pass out (faint).  You have a seizure.  Summary  A migraine headache is a very strong throbbing pain on one side or both sides of your head. These headaches can also cause other symptoms.  This condition may be treated with medicines and changes to your lifestyle.  Keep a journal to find out what may bring on your migraine headaches.  Contact a doctor if you get a migraine headache that is different or worse than others you have had.  Contact your doctor if you have more than 15 headache days in a month.  This information is not intended to replace advice given to you by your health care provider. Make sure you discuss any questions you have with your health care provider.  Document Revised: 04/10/2020 Document Reviewed: 01/30/2020  Elsevier Patient Education © 2021 [employer]

## 2022-08-09 ENCOUNTER — HOSPITAL ENCOUNTER (OUTPATIENT)
Dept: MRI IMAGING | Facility: HOSPITAL | Age: 32
Discharge: HOME | End: 2022-08-09

## 2022-08-09 ENCOUNTER — LAB (OUTPATIENT)
Dept: LAB | Facility: HOSPITAL | Age: 32
End: 2022-08-09

## 2022-08-09 LAB — TSH SERPL DL<=0.05 MIU/L-ACNC: 1.81 UIU/ML (ref 0.27–4.2)

## 2022-08-09 PROCEDURE — 84443 ASSAY THYROID STIM HORMONE: CPT | Performed by: STUDENT IN AN ORGANIZED HEALTH CARE EDUCATION/TRAINING PROGRAM

## 2022-08-09 PROCEDURE — 70551 MRI BRAIN STEM W/O DYE: CPT

## 2022-08-10 ENCOUNTER — PATIENT MESSAGE (OUTPATIENT)
Dept: FAMILY MEDICINE CLINIC | Facility: CLINIC | Age: 32
End: 2022-08-10

## 2022-08-10 DIAGNOSIS — E89.0 HYPOTHYROIDISM ASSOCIATED WITH SURGICAL PROCEDURE: ICD-10-CM

## 2022-08-10 DIAGNOSIS — E55.9 VITAMIN D INSUFFICIENCY: ICD-10-CM

## 2022-08-15 DIAGNOSIS — J01.90 ACUTE BACTERIAL SINUSITIS: Primary | ICD-10-CM

## 2022-08-15 DIAGNOSIS — B96.89 ACUTE BACTERIAL SINUSITIS: Primary | ICD-10-CM

## 2022-08-15 RX ORDER — DOXYCYCLINE HYCLATE 100 MG/1
100 CAPSULE ORAL 2 TIMES DAILY
Qty: 10 CAPSULE | Refills: 0 | Status: SHIPPED | OUTPATIENT
Start: 2022-08-15 | End: 2022-09-09

## 2022-08-18 RX ORDER — CHOLECALCIFEROL (VITAMIN D3) 25 MCG
2000 CAPSULE ORAL DAILY
Qty: 60 CAPSULE | Refills: 5 | Status: SHIPPED | OUTPATIENT
Start: 2022-08-18

## 2022-08-18 RX ORDER — LEVOTHYROXINE SODIUM 200 MCG
200 TABLET ORAL DAILY
Qty: 90 TABLET | Refills: 1 | Status: SHIPPED | OUTPATIENT
Start: 2022-08-18

## 2022-09-09 ENCOUNTER — OFFICE VISIT (OUTPATIENT)
Dept: FAMILY MEDICINE CLINIC | Facility: CLINIC | Age: 32
End: 2022-09-09

## 2022-09-09 VITALS
DIASTOLIC BLOOD PRESSURE: 72 MMHG | BODY MASS INDEX: 47.09 KG/M2 | HEART RATE: 85 BPM | SYSTOLIC BLOOD PRESSURE: 120 MMHG | TEMPERATURE: 97.1 F | WEIGHT: 293 LBS | OXYGEN SATURATION: 98 % | HEIGHT: 66 IN

## 2022-09-09 DIAGNOSIS — G43.009 MIGRAINE WITHOUT AURA AND WITHOUT STATUS MIGRAINOSUS, NOT INTRACTABLE: Primary | ICD-10-CM

## 2022-09-09 DIAGNOSIS — Z13.220 NEED FOR LIPID SCREENING: ICD-10-CM

## 2022-09-09 DIAGNOSIS — R45.86 LABILE MOOD: ICD-10-CM

## 2022-09-09 PROCEDURE — 99214 OFFICE O/P EST MOD 30 MIN: CPT | Performed by: STUDENT IN AN ORGANIZED HEALTH CARE EDUCATION/TRAINING PROGRAM

## 2022-09-09 RX ORDER — AMITRIPTYLINE HYDROCHLORIDE 10 MG/1
TABLET, FILM COATED ORAL
Qty: 90 TABLET | Refills: 0 | Status: SHIPPED | OUTPATIENT
Start: 2022-09-09 | End: 2022-11-03

## 2022-09-09 NOTE — PROGRESS NOTES
"Subjective:  Sherrie Smith is a 32 y.o. female who presents for     Headache; at previous point, had MRI which was negative and was started on sumatriptan 50 mg as needed.  Patient states that has been getting migraines every other day, but medication was not ready until today due to insurance issues. States tried taking Ibuprofen 800mg but states caused stomach pain and did not relieve migraine.     Labile Mood; states that for the past several months has had issues with labile mood, decreased sleep. Denied any new stressors, medication changes, SI/HI/AV hallucinations.     Patient Active Problem List   Diagnosis   • Hypothyroidism associated with surgical procedure   • Hypothyroidism   • Endometriosis   • Diffuse thyroid goiter without thyrotoxicosis     Vitals:    Vitals:    09/09/22 1555   BP: 120/72   BP Location: Right arm   Patient Position: Sitting   Cuff Size: Large Adult   Pulse: 85   Temp: 97.1 °F (36.2 °C)   SpO2: 98%   Weight: (!) 137 kg (303 lb)   Height: 167.6 cm (66\")     Body mass index is 48.91 kg/m².      Current Outpatient Medications:   •  furosemide (LASIX) 20 MG tablet, furosemide 20 mg tablet  TAKE ONE TABLET AS NEEDED FOR swelling as needed but not more THAN ONCE PER DAY, Disp: , Rfl:   •  Linzess 145 MCG capsule capsule, , Disp: , Rfl:   •  naproxen (NAPROSYN) 500 MG tablet, , Disp: , Rfl:   •  Synthroid 200 MCG tablet, Take 1 tablet by mouth Daily., Disp: 90 tablet, Rfl: 1  •  valACYclovir (VALTREX) 500 MG tablet, 500 mg twice daily for 3 days within 1 day of lesion onset, Disp: 30 tablet, Rfl: 3  •  amitriptyline (ELAVIL) 10 MG tablet, Take 1 tablet nightly, may increase to 2 tablets nightly if needed/tolerated., Disp: 90 tablet, Rfl: 0  •  Cholecalciferol (Vitamin D-3) 25 MCG (1000 UT) capsule, Take 2,000 Units by mouth Daily., Disp: 60 capsule, Rfl: 5  •  Cyanocobalamin 1000 MCG/ML kit, Administer injection IM or SQ 3 times weekly x 2 weeks and then once monthly injections, Disp: 1 " kit, Rfl: 11  •  SUMAtriptan (Imitrex) 50 MG tablet, Take 1 tablet by mouth 1 (One) Time As Needed for Migraine. Take one tablet at onset of headache. May repeat dose one time in 2 hours if headache not relieved., Disp: 10 tablet, Rfl: 2    Patient Active Problem List   Diagnosis   • Hypothyroidism associated with surgical procedure   • Hypothyroidism   • Endometriosis   • Diffuse thyroid goiter without thyrotoxicosis     Past Surgical History:   Procedure Laterality Date   • BUNIONECTOMY     • LAPAROSCOPY DIAGNOSTIC / BIOPSY / ASPIRATION / LYSIS     • THYROIDECTOMY       Social History     Socioeconomic History   • Marital status: Single   Tobacco Use   • Smoking status: Former Smoker     Types: Cigarettes   • Smokeless tobacco: Never Used   Vaping Use   • Vaping Use: Never used   Substance and Sexual Activity   • Alcohol use: Never   • Drug use: Never   • Sexual activity: Yes     Partners: Male     Birth control/protection: Condom     Family History   Problem Relation Age of Onset   • Hyperlipidemia Mother    • Thyroid disease Mother    • Atrial fibrillation Father    • Heart disease Father    • Prostate cancer Father    • Thyroid disease Maternal Grandmother    • Melanoma Paternal Grandfather      Office Visit on 08/04/2022   Component Date Value Ref Range Status   • TSH 08/09/2022 1.810  0.270 - 4.200 uIU/mL Final   Hospital Outpatient Visit on 06/17/2022   Component Date Value Ref Range Status   • Target HR (85%) 06/17/2022 159  bpm Final   • Max. Pred. HR (100%) 06/17/2022 187  bpm Final   • Right Common Femoral Augment 06/17/2022 Y   Final   • Right Common Femoral Compress 06/17/2022 C   Final   • Right Saphenofemoral Junction Augm* 06/17/2022 Y   Final   • Right Saphenofemoral Junction Comp* 06/17/2022 C   Final   • Right Profunda Femoral Augment 06/17/2022 Y   Final   • Right Proximal Femoral Augment 06/17/2022 Y   Final   • Right Proximal Femoral Compress 06/17/2022 C   Final   • Right Mid Femoral Augment  06/17/2022 Y   Final   • Right Mid Femoral Compress 06/17/2022 C   Final   • Right Distal Femoral Augment 06/17/2022 Y   Final   • Right Distal Femoral Compress 06/17/2022 C   Final   • Right Popliteal Augment 06/17/2022 Y   Final   • Right Popliteal Compress 06/17/2022 C   Final   • Right Posterior Tibial Augment 06/17/2022 Y   Final   • Right Peroneal Augment 06/17/2022 Y   Final   • Right Greater Saph AK Augment 06/17/2022 Y   Final   • Right Greater Saph AK Competent 06/17/2022 Y   Final   • Right Greater Saph AK Compress 06/17/2022 C   Final   • Right Greater Saph BK Augment 06/17/2022 Y   Final   • Right Greater Saph BK Competent 06/17/2022 Y   Final   • Right Greater Saph BK Compress 06/17/2022 C   Final   • Left Common Femoral Augment 06/17/2022 Y   Final   • Left Common Femoral Compress 06/17/2022 C   Final   • Left Saphenofemoral Junction Augme* 06/17/2022 Y   Final   • Left Saphenofemoral Junction Compr* 06/17/2022 C   Final   • Left Profunda Femoral Augment 06/17/2022 Y   Final   • Left Proximal Femoral Augment 06/17/2022 Y   Final   • Left Proximal Femoral Compress 06/17/2022 C   Final   • Left Mid Femoral Augment 06/17/2022 Y   Final   • Left Mid Femoral Compress 06/17/2022 C   Final   • Left Distal Femoral Augment 06/17/2022 Y   Final   • Left Distal Femoral Compress 06/17/2022 C   Final   • Left Popliteal Augment 06/17/2022 Y   Final   • Left Popliteal Compress 06/17/2022 C   Final   • Left Posterior Tibial Augment 06/17/2022 Y   Final   • Left Peroneal Augment 06/17/2022 Y   Final   • Left Greater Saph AK Competent 06/17/2022 Y   Final   • Left Greater Saph AK Compress 06/17/2022 C   Final   • Left Greater Saph BK Augment 06/17/2022 Y   Final   • Left Greater Saph BK Competent 06/17/2022 Y   Final   • Left Greater Saph BK Compress 06/17/2022 C   Final   Lab on 05/24/2022   Component Date Value Ref Range Status   • DHEA-Sulfate 05/24/2022 44.6 (A) 84.8 - 378.0 ug/dL Final   Office Visit on 05/24/2022    Component Date Value Ref Range Status   • TSH 05/24/2022 21.400 (A) 0.270 - 4.200 uIU/mL Final   • Free T4 05/24/2022 0.82 (A) 0.93 - 1.70 ng/dL Final   Office Visit on 03/14/2022   Component Date Value Ref Range Status   • Chlamydia DNA by PCR 03/14/2022 Negative  Negative Final   • Neisseria gonorrhoeae by PCR 03/14/2022 Negative  Negative Final   • Trichomonas vaginalis PCR 03/14/2022 Negative  Negative, Invalid Final   • Testosterone, Total 03/14/2022 32.2  10.0 - 55.0 ng/dL Final   • Testosterone, Free 03/14/2022 1.8  0.0 - 4.2 pg/mL Final   • Sex Hormone Binding Globulin 03/14/2022 25.0  24.6 - 122.0 nmol/L Final   • Prolactin 03/14/2022 8.02  4.79 - 23.30 ng/mL Final   • DHEA-Sulfate 03/14/2022 69.8 (A) 84.8 - 378.0 ug/dL Final   • 17-Hydroxyprogesterone 03/14/2022 42  ng/dL Final                              Adult Female                             Follicular        15 -  70                             Luteal            35 - 290   • Case Report 03/14/2022    Final                    Value:Gynecologic Cytology Report                       Case: [id number]                                  Authorizing Provider:  Nicole Shrestha APRN   Collected:           03/14/2022 03:27 PM          Ordering Location:     [facility]   Received:            03/14/2022 04:10 PM                                 [facility]                                                         First Screen:          Luis E Felton                                                             Specimen:    Sendout to [facility], Cervix                                                                    • HPV Report, Addendum 03/14/2022    Final                    Value:This result contains rich text formatting which cannot be displayed here.   • Interpretation 03/14/2022 See attached report    Final      MRI Brain Without Contrast  Narrative: EXAM:  MR BRAIN WITHOUT IV CONTRAST    ORDERING PROVIDER:  KWAKU SALDANA  HISTORY:   Headache    COMPARISON:     TECHNIQUE:   Axial T1-weighted, T2-weighted and diffusion weighted, FLAIR, and  sagittal T1-weighted images were obtained without administration  of gadolinium .    FINDINGS:    CEREBRAL PARENCHYMA: No abnormal signal. No encephalomalacia,  mass or gliosis. No atrophy.      POSTERIOR FOSSA: No mass or abnormal signal. Unremarkable  craniocervical junction.    EXTRA-AXIAL SPACES: No mass.     PITUITARY: No mass or parasellar abnormality.    ORBITS: No mass. Unremarkable extraocular muscles, globe and  optic nerve.    CALVARIA AND SOFT TISSUES:  No mass, adenopathy, or abnormal  signal.    TEMPORAL BONE AND SKULL BASE: Unremarkable mastoid air cells,  inner and middle ear.    PARANASAL SINUSES: Scattered mucoperiosteal thickening of the  aerated sinuses, most significant in bilateral maxillary sinuses  and to a lesser extent in bilateral ethmoidal and right frontal  sinuses.     VASCULAR STRUCTURES: Expected flow voids. No aneurysm, stenosis  or dissection seen.    DIFFUSION WEIGHTED IMAGES: No restricted diffusion.  Impression: acute intracranial process.  Scattered mucoperiosteal thickening of the aerated sinuses, most  significant in bilateral maxillary sinuses and to a lesser extent  in bilateral ethmoidal, and right frontal sinuses.     Electronically signed by:  Demar Willams MD  8/9/2022 9:32 PM CDT  Workstation: [id number]      @[email or web address]@  Immunization History   Administered Date(s) Administered   • COVID-19 (MODERNA) 1st, 2nd, 3rd Dose Only 11/28/2021, 03/14/2022   • DTP 1990, 1990, 1990, 07/22/1992, 08/06/1993   • Hep B, Adolescent or Pediatric 10/02/2006   • HiB 07/22/1992, 08/06/1993   • MMR 05/11/1990, 08/30/1994   • OPV 1989, 1989, 1989, 08/06/1993   • Td 10/02/2006   • Tdap 09/19/2016     The following portions of the patient's history were reviewed and updated as appropriate: allergies, current medications, past family history,  past medical history, past social history, past surgical history and problem list.    PHQ-9 Total Score:             Physical Exam  Constitutional:       Appearance: Normal appearance.   HENT:      Head: Normocephalic and atraumatic.      Right Ear: External ear normal.      Left Ear: External ear normal.   Eyes:      General:         Right eye: No discharge.         Left eye: No discharge.      Conjunctiva/sclera: Conjunctivae normal.   Cardiovascular:      Rate and Rhythm: Normal rate and regular rhythm.      Pulses: Normal pulses.      Heart sounds: Normal heart sounds. No murmur heard.  Pulmonary:      Effort: Pulmonary effort is normal. No respiratory distress.      Breath sounds: Normal breath sounds.   Abdominal:      General: There is no distension.      Palpations: Abdomen is soft.      Tenderness: There is no abdominal tenderness.   Musculoskeletal:      Cervical back: Normal range of motion.      Right lower leg: No edema.      Left lower leg: No edema.   Lymphadenopathy:      Cervical: No cervical adenopathy.   Neurological:      Mental Status: She is alert. Mental status is at baseline.   Psychiatric:         Mood and Affect: Mood normal.         Behavior: Behavior normal.         Assessment & Plan    Diagnosis Plan   1. Migraine without aura and without status migrainosus, not intractable  amitriptyline (ELAVIL) 10 MG tablet   2. Labile mood  amitriptyline (ELAVIL) 10 MG tablet    CBC & Differential    Comprehensive Metabolic Panel    Ambulatory Referral to Behavioral Health   3. Need for lipid screening  Lipid Panel      Orders Placed This Encounter   Procedures   • Comprehensive Metabolic Panel     Order Specific Question:   Release to patient     Answer:   Immediate   • Lipid Panel   • CBC Auto Differential   • Ambulatory Referral to Behavioral Health     Referral Priority:   Routine     Referral Type:   Behavorial Health/Psych     Referral Reason:   Specialty Services Required     Requested  Specialty:   Behavioral Health     Number of Visits Requested:   1   • CBC & Differential     Order Specific Question:   Manual Differential     Answer:   No     Migraine/labile mood; after discussion with patient, will start amitriptyline as above, counseled on possible adverse effects of medication, benefits of medication.  Labs as above, refer to behavioral health for mood lability.  Patient voiced understanding, agreeable to plan.  Follow-up in 1 month or sooner if needed.          This document has been electronically signed by James Angulo MD on September 26, 2022 00:58 CDT    EMR Dragon/Transciption Disclaimer: Some of this note may be an electronic transcription/translation of spoken language to printed text.  The electronic translation of spoken language may permit erroneous, or at times, nonsensical words or phrases to be inadvertently transcribed. Although I have reviewed the note for such errors, some may still exist.

## 2022-09-09 NOTE — PATIENT INSTRUCTIONS
Managing Anxiety, Adult  After being diagnosed with an anxiety disorder, you may be relieved to know why you have felt or behaved a certain way. You may also feel overwhelmed about the treatment ahead and what it will mean for your life. With care and support, you can manage this condition and recover from it.  How to manage lifestyle changes  Managing stress and anxiety    Stress is your body's reaction to life changes and events, both good and bad. Most stress will last just a few hours, but stress can be ongoing and can lead to more than just stress. Although stress can play a major role in anxiety, it is not the same as anxiety. Stress is usually caused by something external, such as a deadline, test, or competition. Stress normally passes after the triggering event has ended.   Anxiety is caused by something internal, such as imagining a terrible outcome or worrying that something will go wrong that will devastate you. Anxiety often does not go away even after the triggering event is over, and it can become long-term (chronic) worry. It is important to understand the differences between stress and anxiety and to manage your stress effectively so that it does not lead to an anxious response.  Talk with your health care provider or a counselor to learn more about reducing anxiety and stress. He or she may suggest tension reduction techniques, such as:  Music therapy. This can include creating or listening to music that you enjoy and that inspires you.  Mindfulness-based meditation. This involves being aware of your normal breaths while not trying to control your breathing. It can be done while sitting or walking.  Centering prayer. This involves focusing on a word, phrase, or sacred image that means something to you and brings you peace.  Deep breathing. To do this, expand your stomach and inhale slowly through your nose. Hold your breath for 3-5 seconds. Then exhale slowly, letting your stomach muscles  relax.  Self-talk. This involves identifying thought patterns that lead to anxiety reactions and changing those patterns.  Muscle relaxation. This involves tensing muscles and then relaxing them.  Choose a tension reduction technique that suits your lifestyle and personality. These techniques take time and practice. Set aside 5-15 minutes a day to do them. Therapists can offer counseling and training in these techniques. The training to help with anxiety may be covered by some insurance plans. Other things you can do to manage stress and anxiety include:  Keeping a stress/anxiety diary. This can help you learn what triggers your reaction and then learn ways to manage your response.  Thinking about how you react to certain situations. You may not be able to control everything, but you can control your response.  Making time for activities that help you relax and not feeling guilty about spending your time in this way.  Visual imagery and yoga can help you stay calm and relax.    Medicines  Medicines can help ease symptoms. Medicines for anxiety include:  Anti-anxiety drugs.  Antidepressants.  Medicines are often used as a primary treatment for anxiety disorder. Medicines will be prescribed by a health care provider. When used together, medicines, psychotherapy, and tension reduction techniques may be the most effective treatment.  Relationships  Relationships can play a big part in helping you recover. Try to spend more time connecting with trusted friends and family members. Consider going to couples counseling, taking family education classes, or going to family therapy. Therapy can help you and others better understand your condition.  How to recognize changes in your anxiety  Everyone responds differently to treatment for anxiety. Recovery from anxiety happens when symptoms decrease and stop interfering with your daily activities at home or work. This may mean that you will start to:  Have better concentration  and focus. Worry will interfere less in your daily thinking.  Sleep better.  Be less irritable.  Have more energy.  Have improved memory.  It is important to recognize when your condition is getting worse. Contact your health care provider if your symptoms interfere with home or work and you feel like your condition is not improving.  Follow these instructions at home:  Activity  Exercise. Most adults should do the following:  Exercise for at least 150 minutes each week. The exercise should increase your heart rate and make you sweat (moderate-intensity exercise).  Strengthening exercises at least twice a week.  Get the right amount and quality of sleep. Most adults need 7-9 hours of sleep each night.  Lifestyle    Eat a healthy diet that includes plenty of vegetables, fruits, whole grains, low-fat dairy products, and lean protein. Do not eat a lot of foods that are high in solid fats, added sugars, or salt.  Make choices that simplify your life.  Do not use any products that contain nicotine or tobacco, such as cigarettes, e-cigarettes, and chewing tobacco. If you need help quitting, ask your health care provider.  Avoid caffeine, alcohol, and certain over-the-counter cold medicines. These may make you feel worse. Ask your pharmacist which medicines to avoid.    General instructions  Take over-the-counter and prescription medicines only as told by your health care provider.  Keep all follow-up visits as told by your health care provider. This is important.  Where to find support  You can get help and support from these sources:  Self-help groups.  Online and community organizations.  A trusted spiritual leader.  Couples counseling.  Family education classes.  Family therapy.  Where to find more information  You may find that joining a support group helps you deal with your anxiety. The following sources can help you locate counselors or support groups near you:  Mental Health [place]:  www.mentalhealth[place].net  Anxiety and Depression Association of [place] (ADAA): www.adaa.org  National [place] on Mental Illness (NATALYA): www.natalya.org  Contact a health care provider if you:  Have a hard time staying focused or finishing daily tasks.  Spend many hours a day feeling worried about everyday life.  Become exhausted by worry.  Start to have headaches, feel tense, or have nausea.  Urinate more than normal.  Have diarrhea.  Get help right away if you have:  A racing heart and shortness of breath.  Thoughts of hurting yourself or others.  If you ever feel like you may hurt yourself or others, or have thoughts about taking your own life, get help right away. You can go to your nearest emergency department or call:  Your local emergency services (911 in the [place]  A suicide crisis helpline, such as the National Suicide Prevention Lifeline at 1-726.389.4741. This is open 24 hours a day.  Summary  Taking steps to learn and use tension reduction techniques can help calm you and help prevent triggering an anxiety reaction.  When used together, medicines, psychotherapy, and tension reduction techniques may be the most effective treatment.  Family, friends, and partners can play a big part in helping you recover from an anxiety disorder.  This information is not intended to replace advice given to you by your health care provider. Make sure you discuss any questions you have with your health care provider.  Document Revised: 05/19/2020 Document Reviewed: 05/19/2020  [employer] Patient Education © 2021 [employer]

## 2022-09-14 ENCOUNTER — PATIENT MESSAGE (OUTPATIENT)
Dept: FAMILY MEDICINE CLINIC | Facility: CLINIC | Age: 32
End: 2022-09-14

## 2022-09-16 ENCOUNTER — LAB (OUTPATIENT)
Dept: LAB | Facility: HOSPITAL | Age: 32
End: 2022-09-16

## 2022-09-16 LAB
ALBUMIN SERPL-MCNC: 4.3 G/DL (ref 3.5–5.2)
ALBUMIN/GLOB SERPL: 1.7 G/DL
ALP SERPL-CCNC: 96 U/L (ref 39–117)
ALT SERPL W P-5'-P-CCNC: 204 U/L (ref 1–33)
ANION GAP SERPL CALCULATED.3IONS-SCNC: 8.8 MMOL/L (ref 5–15)
AST SERPL-CCNC: 130 U/L (ref 1–32)
BASOPHILS # BLD AUTO: 0.06 10*3/MM3 (ref 0–0.2)
BASOPHILS NFR BLD AUTO: 1 % (ref 0–1.5)
BILIRUB SERPL-MCNC: 0.5 MG/DL (ref 0–1.2)
BUN SERPL-MCNC: 14 MG/DL (ref 6–20)
BUN/CREAT SERPL: 17.9 (ref 7–25)
CALCIUM SPEC-SCNC: 8.9 MG/DL (ref 8.6–10.5)
CHLORIDE SERPL-SCNC: 108 MMOL/L (ref 98–107)
CHOLEST SERPL-MCNC: 200 MG/DL (ref 0–200)
CO2 SERPL-SCNC: 25.2 MMOL/L (ref 22–29)
CREAT SERPL-MCNC: 0.78 MG/DL (ref 0.57–1)
DEPRECATED RDW RBC AUTO: 42.9 FL (ref 37–54)
EGFRCR SERPLBLD CKD-EPI 2021: 103 ML/MIN/1.73
EOSINOPHIL # BLD AUTO: 0.31 10*3/MM3 (ref 0–0.4)
EOSINOPHIL NFR BLD AUTO: 5.1 % (ref 0.3–6.2)
ERYTHROCYTE [DISTWIDTH] IN BLOOD BY AUTOMATED COUNT: 13 % (ref 12.3–15.4)
GLOBULIN UR ELPH-MCNC: 2.6 GM/DL
GLUCOSE SERPL-MCNC: 93 MG/DL (ref 65–99)
HCT VFR BLD AUTO: 38 % (ref 34–46.6)
HDLC SERPL-MCNC: 42 MG/DL (ref 40–60)
HGB BLD-MCNC: 12.8 G/DL (ref 12–15.9)
IMM GRANULOCYTES # BLD AUTO: 0.02 10*3/MM3 (ref 0–0.05)
IMM GRANULOCYTES NFR BLD AUTO: 0.3 % (ref 0–0.5)
LDLC SERPL CALC-MCNC: 127 MG/DL (ref 0–100)
LDLC/HDLC SERPL: 2.94 {RATIO}
LYMPHOCYTES # BLD AUTO: 1.66 10*3/MM3 (ref 0.7–3.1)
LYMPHOCYTES NFR BLD AUTO: 27.6 % (ref 19.6–45.3)
MCH RBC QN AUTO: 30.7 PG (ref 26.6–33)
MCHC RBC AUTO-ENTMCNC: 33.7 G/DL (ref 31.5–35.7)
MCV RBC AUTO: 91.1 FL (ref 79–97)
MONOCYTES # BLD AUTO: 0.49 10*3/MM3 (ref 0.1–0.9)
MONOCYTES NFR BLD AUTO: 8.1 % (ref 5–12)
NEUTROPHILS NFR BLD AUTO: 3.48 10*3/MM3 (ref 1.7–7)
NEUTROPHILS NFR BLD AUTO: 57.9 % (ref 42.7–76)
NRBC BLD AUTO-RTO: 0 /100 WBC (ref 0–0.2)
PLATELET # BLD AUTO: 238 10*3/MM3 (ref 140–450)
PMV BLD AUTO: 10.9 FL (ref 6–12)
POTASSIUM SERPL-SCNC: 5 MMOL/L (ref 3.5–5.2)
PROT SERPL-MCNC: 6.9 G/DL (ref 6–8.5)
RBC # BLD AUTO: 4.17 10*6/MM3 (ref 3.77–5.28)
SODIUM SERPL-SCNC: 142 MMOL/L (ref 136–145)
TRIGL SERPL-MCNC: 173 MG/DL (ref 0–150)
VLDLC SERPL-MCNC: 31 MG/DL (ref 5–40)
WBC NRBC COR # BLD: 6.02 10*3/MM3 (ref 3.4–10.8)

## 2022-09-16 PROCEDURE — 80061 LIPID PANEL: CPT | Performed by: STUDENT IN AN ORGANIZED HEALTH CARE EDUCATION/TRAINING PROGRAM

## 2022-09-16 PROCEDURE — 85025 COMPLETE CBC W/AUTO DIFF WBC: CPT | Performed by: STUDENT IN AN ORGANIZED HEALTH CARE EDUCATION/TRAINING PROGRAM

## 2022-09-16 PROCEDURE — 80053 COMPREHEN METABOLIC PANEL: CPT | Performed by: STUDENT IN AN ORGANIZED HEALTH CARE EDUCATION/TRAINING PROGRAM

## 2022-10-06 DIAGNOSIS — R79.89 ELEVATED LFTS: Primary | ICD-10-CM

## 2022-10-11 ENCOUNTER — TELEPHONE (OUTPATIENT)
Dept: FAMILY MEDICINE CLINIC | Facility: CLINIC | Age: 32
End: 2022-10-11

## 2022-10-11 NOTE — TELEPHONE ENCOUNTER
LVM for pt to call me back or send me a Ta[employer] message making sure she received her lab results and knows she needs to get more labs drawn.

## 2022-10-24 ENCOUNTER — TELEPHONE (OUTPATIENT)
Dept: FAMILY MEDICINE CLINIC | Facility: CLINIC | Age: 32
End: 2022-10-24

## 2022-11-03 DIAGNOSIS — G43.009 MIGRAINE WITHOUT AURA AND WITHOUT STATUS MIGRAINOSUS, NOT INTRACTABLE: ICD-10-CM

## 2022-11-03 DIAGNOSIS — R45.86 LABILE MOOD: ICD-10-CM

## 2022-11-03 RX ORDER — AMITRIPTYLINE HYDROCHLORIDE 10 MG/1
TABLET, FILM COATED ORAL
Qty: 90 TABLET | Refills: 0 | Status: SHIPPED | OUTPATIENT
Start: 2022-11-03

## 2022-11-03 NOTE — TELEPHONE ENCOUNTER
Rx Refill Note  Requested Prescriptions     Pending Prescriptions Disp Refills   • amitriptyline (ELAVIL) 10 MG tablet [Pharmacy Med Name: AMITRIPTYLINE 10MG TABLETS] 90 tablet 0     Sig: TAKE 1 TABLET BY MOUTH EVERY NIGHT. MAY INCREASE TO 2 TABLETS EVERY NIGHT AS NEEDED/ TOLERATED      Last office visit with prescribing clinician: 9/9/2022      Next office visit with prescribing clinician: 12/9/2022            Opal Mac MA  11/03/22, 11:38 CDT

## 2022-11-04 ENCOUNTER — LAB (OUTPATIENT)
Dept: LAB | Facility: HOSPITAL | Age: 32
End: 2022-11-04

## 2022-11-04 DIAGNOSIS — R79.89 ELEVATED LFTS: ICD-10-CM

## 2022-11-04 PROCEDURE — 86704 HEP B CORE ANTIBODY TOTAL: CPT

## 2022-11-04 PROCEDURE — 86376 MICROSOMAL ANTIBODY EACH: CPT

## 2022-11-04 PROCEDURE — 83540 ASSAY OF IRON: CPT

## 2022-11-04 PROCEDURE — 86235 NUCLEAR ANTIGEN ANTIBODY: CPT

## 2022-11-04 PROCEDURE — 86706 HEP B SURFACE ANTIBODY: CPT

## 2022-11-04 PROCEDURE — 80053 COMPREHEN METABOLIC PANEL: CPT

## 2022-11-04 PROCEDURE — 82728 ASSAY OF FERRITIN: CPT

## 2022-11-04 PROCEDURE — 87340 HEPATITIS B SURFACE AG IA: CPT

## 2022-11-04 PROCEDURE — 82977 ASSAY OF GGT: CPT

## 2022-11-04 PROCEDURE — 82248 BILIRUBIN DIRECT: CPT

## 2022-11-04 PROCEDURE — 86015 ACTIN ANTIBODY EACH: CPT

## 2022-11-04 PROCEDURE — 84466 ASSAY OF TRANSFERRIN: CPT

## 2022-11-04 PROCEDURE — 86038 ANTINUCLEAR ANTIBODIES: CPT

## 2022-11-04 PROCEDURE — 86225 DNA ANTIBODY NATIVE: CPT

## 2022-11-05 LAB
ALBUMIN SERPL-MCNC: 4 G/DL (ref 3.5–5.2)
ALBUMIN/GLOB SERPL: 1.4 G/DL
ALP SERPL-CCNC: 91 U/L (ref 39–117)
ALT SERPL W P-5'-P-CCNC: 54 U/L (ref 1–33)
ANION GAP SERPL CALCULATED.3IONS-SCNC: 13.5 MMOL/L (ref 5–15)
AST SERPL-CCNC: 41 U/L (ref 1–32)
BILIRUB CONJ SERPL-MCNC: <0.2 MG/DL (ref 0–0.3)
BILIRUB SERPL-MCNC: 0.2 MG/DL (ref 0–1.2)
BUN SERPL-MCNC: 10 MG/DL (ref 6–20)
BUN/CREAT SERPL: 11.8 (ref 7–25)
CALCIUM SPEC-SCNC: 8.6 MG/DL (ref 8.6–10.5)
CHLORIDE SERPL-SCNC: 102 MMOL/L (ref 98–107)
CO2 SERPL-SCNC: 22.5 MMOL/L (ref 22–29)
CREAT SERPL-MCNC: 0.85 MG/DL (ref 0.57–1)
EGFRCR SERPLBLD CKD-EPI 2021: 92.9 ML/MIN/1.73
FERRITIN SERPL-MCNC: 23.5 NG/ML (ref 13–150)
GGT SERPL-CCNC: 40 U/L (ref 5–36)
GLOBULIN UR ELPH-MCNC: 2.9 GM/DL
GLUCOSE SERPL-MCNC: 102 MG/DL (ref 65–99)
HBV SURFACE AB SER RIA-ACNC: REACTIVE
HBV SURFACE AG SERPL QL IA: NORMAL
IRON 24H UR-MRATE: 61 MCG/DL (ref 37–145)
IRON SATN MFR SERPL: 14 % (ref 20–50)
POTASSIUM SERPL-SCNC: 3.9 MMOL/L (ref 3.5–5.2)
PROT SERPL-MCNC: 6.9 G/DL (ref 6–8.5)
SODIUM SERPL-SCNC: 138 MMOL/L (ref 136–145)
TIBC SERPL-MCNC: 438 MCG/DL (ref 298–536)
TRANSFERRIN SERPL-MCNC: 294 MG/DL (ref 200–360)

## 2022-11-06 LAB — HBV CORE AB SERPL QL IA: NEGATIVE

## 2022-11-07 LAB
LKM-1 AB SER-ACNC: 1.2 UNITS (ref 0–20)
SMA IGG SER-ACNC: 12 UNITS (ref 0–19)

## 2022-11-10 LAB
ANA HOMOGEN TITR SER: ABNORMAL {TITER}
ANA SER QL IF: POSITIVE
CENTROMERE B AB SER-ACNC: <0.2 AI (ref 0–0.9)
CHROMATIN AB SERPL-ACNC: <0.2 AI (ref 0–0.9)
DSDNA AB SER-ACNC: 2 IU/ML (ref 0–9)
ENA JO1 AB SER-ACNC: <0.2 AI (ref 0–0.9)
ENA RNP AB SER-ACNC: <0.2 AI (ref 0–0.9)
ENA SCL70 AB SER-ACNC: 0.4 AI (ref 0–0.9)
ENA SM AB SER-ACNC: <0.2 AI (ref 0–0.9)
ENA SS-A AB SER-ACNC: <0.2 AI (ref 0–0.9)
ENA SS-B AB SER-ACNC: <0.2 AI (ref 0–0.9)
LABORATORY COMMENT REPORT: ABNORMAL
Lab: ABNORMAL
Lab: ABNORMAL

## 2022-11-11 DIAGNOSIS — R76.8 POSITIVE ANA (ANTINUCLEAR ANTIBODY): Primary | ICD-10-CM

## 2022-12-15 ENCOUNTER — TELEPHONE (OUTPATIENT)
Dept: FAMILY MEDICINE CLINIC | Facility: CLINIC | Age: 32
End: 2022-12-15

## 2022-12-15 NOTE — TELEPHONE ENCOUNTER
Called and left a voicemail that the rheumatologist that she was referred to was not in network.I ask her to call her insurance and let us know so we could get to work on that .

## 2023-01-26 ENCOUNTER — TELEPHONE (OUTPATIENT)
Dept: FAMILY MEDICINE CLINIC | Facility: CLINIC | Age: 33
End: 2023-01-26
Payer: MEDICAID

## 2023-01-26 NOTE — TELEPHONE ENCOUNTER
Patient called asking for an appointment with her provider as she stated nothing urgent just a check up and wanted labs.  Patient stated just needed appointment after 2:15.  The next available appointment with provider for that time frame was February 24 and patient stated that was too late and unacceptable and then proceeded to ask if she could change to a different provider.  I explained to her that she was unable to and most of the providers were out several weeks for routine appointments.  Patient stated that all he was going to tell her was to get labs.  I asked her if she wanted me to make that appointment and patient hung up

## 2023-04-14 ENCOUNTER — LAB (OUTPATIENT)
Dept: LAB | Facility: HOSPITAL | Age: 33
End: 2023-04-14
Payer: MEDICAID

## 2023-04-14 ENCOUNTER — OFFICE VISIT (OUTPATIENT)
Dept: FAMILY MEDICINE CLINIC | Facility: CLINIC | Age: 33
End: 2023-04-14
Payer: MEDICAID

## 2023-04-14 VITALS
BODY MASS INDEX: 47.09 KG/M2 | HEIGHT: 66 IN | WEIGHT: 293 LBS | OXYGEN SATURATION: 98 % | HEART RATE: 88 BPM | DIASTOLIC BLOOD PRESSURE: 70 MMHG | SYSTOLIC BLOOD PRESSURE: 116 MMHG

## 2023-04-14 DIAGNOSIS — E89.0 HYPOTHYROIDISM ASSOCIATED WITH SURGICAL PROCEDURE: ICD-10-CM

## 2023-04-14 DIAGNOSIS — Z91.89 AT RISK FOR SLEEP APNEA: ICD-10-CM

## 2023-04-14 DIAGNOSIS — R51.9 NEW ONSET HEADACHE: ICD-10-CM

## 2023-04-14 DIAGNOSIS — G43.001 MIGRAINE WITHOUT AURA AND WITH STATUS MIGRAINOSUS, NOT INTRACTABLE: Primary | ICD-10-CM

## 2023-04-14 DIAGNOSIS — E03.9 HYPOTHYROIDISM, UNSPECIFIED TYPE: ICD-10-CM

## 2023-04-14 DIAGNOSIS — R45.86 LABILE MOOD: ICD-10-CM

## 2023-04-14 DIAGNOSIS — R79.89 ELEVATED LFTS: ICD-10-CM

## 2023-04-14 DIAGNOSIS — J30.2 SEASONAL ALLERGIES: ICD-10-CM

## 2023-04-14 DIAGNOSIS — G43.009 MIGRAINE WITHOUT AURA AND WITHOUT STATUS MIGRAINOSUS, NOT INTRACTABLE: ICD-10-CM

## 2023-04-14 DIAGNOSIS — A60.04 HERPES SIMPLEX VULVOVAGINITIS: ICD-10-CM

## 2023-04-14 PROCEDURE — 84443 ASSAY THYROID STIM HORMONE: CPT | Performed by: STUDENT IN AN ORGANIZED HEALTH CARE EDUCATION/TRAINING PROGRAM

## 2023-04-14 PROCEDURE — 84439 ASSAY OF FREE THYROXINE: CPT | Performed by: STUDENT IN AN ORGANIZED HEALTH CARE EDUCATION/TRAINING PROGRAM

## 2023-04-14 PROCEDURE — 80053 COMPREHEN METABOLIC PANEL: CPT | Performed by: STUDENT IN AN ORGANIZED HEALTH CARE EDUCATION/TRAINING PROGRAM

## 2023-04-14 RX ORDER — SUMATRIPTAN 100 MG/1
100 TABLET, FILM COATED ORAL ONCE AS NEEDED
Qty: 10 TABLET | Refills: 1 | Status: SHIPPED | OUTPATIENT
Start: 2023-04-14

## 2023-04-14 RX ORDER — FLUTICASONE PROPIONATE 50 MCG
2 SPRAY, SUSPENSION (ML) NASAL DAILY
Qty: 18.2 ML | Refills: 3 | Status: SHIPPED | OUTPATIENT
Start: 2023-04-14

## 2023-04-14 RX ORDER — AMITRIPTYLINE HYDROCHLORIDE 10 MG/1
TABLET, FILM COATED ORAL
Qty: 90 TABLET | Refills: 0 | Status: SHIPPED | OUTPATIENT
Start: 2023-04-14

## 2023-04-14 RX ORDER — VALACYCLOVIR HYDROCHLORIDE 500 MG/1
TABLET, FILM COATED ORAL
Qty: 30 TABLET | Refills: 3 | Status: SHIPPED | OUTPATIENT
Start: 2023-04-14

## 2023-04-14 RX ORDER — LEVOTHYROXINE SODIUM 200 MCG
200 TABLET ORAL DAILY
Qty: 90 TABLET | Refills: 1 | Status: SHIPPED | OUTPATIENT
Start: 2023-04-14

## 2023-04-14 RX ORDER — LORATADINE 10 MG/1
10 TABLET ORAL DAILY
Qty: 90 TABLET | Refills: 1 | Status: SHIPPED | OUTPATIENT
Start: 2023-04-14

## 2023-04-14 NOTE — LETTER
[address]   [place] [address] [phone]  Dept: [phone]    23    RE:   Patient Name:  Sherrie Smith   :  1990      To whom it may concern.    Sherrie is my patient, and has been under my care since .  I am familiar with her history and with the functional limitations imposed by her disability.  She meets the definition of disability under the Americans with Disabilities Act, the Fair Housing Act, and the Rehabilitation Act of 1973.     Due to mental illness, Sherrie has certain limitations.  In order to help alleviate these difficulties, and to enhance his/her ability to live independently and to fully use and enjoy the dwelling unit you own and/or administer, I am prescribing an emotional support animal Smokey, a gray feline that will assist Sherrie in coping with her disability.    I would be happy to answer other questions you may have concerning my recommendation that Sherrie have an emotional support animal.  Should you have additional questions, please do not hesitate to contact me.    Sincerely,      This document has been electronically signed by James Angulo MD on 2023 09:06 CDT

## 2023-04-14 NOTE — PROGRESS NOTES
"Subjective:  Sherrie Smith is a 33 y.o. female who presents for     Migraine; at previous appointment due to need for migraine prophylaxis, concurrently with labile mood, decision was made to start on amitriptyline 10 mg nightly.  Patient states that she did not  medication. Continues to have mood and sleep disturbances, denied any SI/HI/A/V hallucinations.  In addition, is on Imitrex 50 mg as needed.  States that medication has been ineffective, denied any adverse effects.    Elevated LFTs; at previous appointment, had repeat liver function testing that was improved from previous, denies any abdominal pain, itchiness, yellowing of skin or eyes, nausea, vomiting, diarrhea.    Sore throat; patient states that has had symptoms for 2 days, states that is concerned she may have strep throat due to coworkers having strep throat.  Denies any fever, chills, swollen lymph nodes, fatigue, productive cough.    Patient Active Problem List   Diagnosis   • Hypothyroidism associated with surgical procedure   • Hypothyroidism   • Endometriosis   • Diffuse thyroid goiter without thyrotoxicosis     Vitals:    Vitals:    04/14/23 0825   BP: 116/70   BP Location: Left arm   Pulse: 88   SpO2: 98%   Weight: (!) 142 kg (312 lb 6.4 oz)   Height: 167.6 cm (66\")     Body mass index is 50.42 kg/m².      Current Outpatient Medications:   •  amitriptyline (ELAVIL) 10 MG tablet, TAKE 1 TABLET BY MOUTH EVERY NIGHT. MAY INCREASE TO 2 TABLETS EVERY NIGHT AS NEEDED/ TOLERATED, Disp: 90 tablet, Rfl: 0  •  SUMAtriptan (Imitrex) 100 MG tablet, Take 1 tablet by mouth 1 (One) Time As Needed for Migraine. Take one tablet at onset of headache. May repeat dose one time in 2 hours if headache not relieved., Disp: 10 tablet, Rfl: 1  •  Synthroid 200 MCG tablet, Take 1 tablet by mouth Daily., Disp: 90 tablet, Rfl: 1  •  valACYclovir (VALTREX) 500 MG tablet, 500 mg twice daily for 3 days within 1 day of lesion onset, Disp: 30 tablet, Rfl: 3  •  " Cholecalciferol (Vitamin D-3) 25 MCG (1000 UT) capsule, Take 2,000 Units by mouth Daily., Disp: 60 capsule, Rfl: 5  •  Cyanocobalamin 1000 MCG/ML kit, Administer injection IM or SQ 3 times weekly x 2 weeks and then once monthly injections, Disp: 1 kit, Rfl: 11  •  fluticasone (FLONASE) 50 MCG/ACT nasal spray, 2 sprays into the nostril(s) as directed by provider Daily., Disp: 18.2 mL, Rfl: 3  •  furosemide (LASIX) 20 MG tablet, furosemide 20 mg tablet  TAKE ONE TABLET AS NEEDED FOR swelling as needed but not more THAN ONCE PER DAY, Disp: , Rfl:   •  Linzess 145 MCG capsule capsule, , Disp: , Rfl:   •  loratadine (Claritin) 10 MG tablet, Take 1 tablet by mouth Daily., Disp: 90 tablet, Rfl: 1  •  naproxen (NAPROSYN) 500 MG tablet, , Disp: , Rfl:   •  ubrogepant (Ubrelvy) 100 MG tablet, Take 1 tablet by mouth 1 (One) Time As Needed (migraine) for up to 1 dose., Disp: 10 tablet, Rfl: 0    Patient Active Problem List   Diagnosis   • Hypothyroidism associated with surgical procedure   • Hypothyroidism   • Endometriosis   • Diffuse thyroid goiter without thyrotoxicosis     Past Surgical History:   Procedure Laterality Date   • BUNIONECTOMY     • LAPAROSCOPY DIAGNOSTIC / BIOPSY / ASPIRATION / LYSIS     • THYROIDECTOMY       Social History     Socioeconomic History   • Marital status: Single   Tobacco Use   • Smoking status: Former     Types: Cigarettes   • Smokeless tobacco: Never   Vaping Use   • Vaping Use: Never used   Substance and Sexual Activity   • Alcohol use: Never   • Drug use: Never   • Sexual activity: Yes     Partners: Male     Birth control/protection: Condom     Family History   Problem Relation Age of Onset   • Hyperlipidemia Mother    • Thyroid disease Mother    • Atrial fibrillation Father    • Heart disease Father    • Prostate cancer Father    • Thyroid disease Maternal Grandmother    • Melanoma Paternal Grandfather      Lab on 11/04/2022   Component Date Value Ref Range Status   • Iron 11/04/2022 61  37  - 145 mcg/dL Final   • Iron Saturation 11/04/2022 14 (L)  20 - 50 % Final   • Transferrin 11/04/2022 294  200 - 360 mg/dL Final   • TIBC 11/04/2022 438  298 - 536 mcg/dL Final   • Ferritin 11/04/2022 23.50  13.00 - 150.00 ng/mL Final   • Hepatitis B Surface Ag 11/04/2022 Non-Reactive  Non-Reactive Final   • Hep B S Ab 11/04/2022 Reactive (A)  Non-Reactive Final   • Hep B Core Total Ab 11/04/2022 Negative  Negative Final   • GGT 11/04/2022 40 (H)  5 - 36 U/L Final   • Glucose 11/04/2022 102 (H)  65 - 99 mg/dL Final   • BUN 11/04/2022 10  6 - 20 mg/dL Final   • Creatinine 11/04/2022 0.85  0.57 - 1.00 mg/dL Final   • Sodium 11/04/2022 138  136 - 145 mmol/L Final   • Potassium 11/04/2022 3.9  3.5 - 5.2 mmol/L Final   • Chloride 11/04/2022 102  98 - 107 mmol/L Final   • CO2 11/04/2022 22.5  22.0 - 29.0 mmol/L Final   • Calcium 11/04/2022 8.6  8.6 - 10.5 mg/dL Final   • Total Protein 11/04/2022 6.9  6.0 - 8.5 g/dL Final   • Albumin 11/04/2022 4.00  3.50 - 5.20 g/dL Final   • ALT (SGPT) 11/04/2022 54 (H)  1 - 33 U/L Final   • AST (SGOT) 11/04/2022 41 (H)  1 - 32 U/L Final   • Alkaline Phosphatase 11/04/2022 91  39 - 117 U/L Final   • Total Bilirubin 11/04/2022 0.2  0.0 - 1.2 mg/dL Final   • Globulin 11/04/2022 2.9  gm/dL Final   • A/G Ratio 11/04/2022 1.4  g/dL Final   • BUN/Creatinine Ratio 11/04/2022 11.8  7.0 - 25.0 Final   • Anion Gap 11/04/2022 13.5  5.0 - 15.0 mmol/L Final   • eGFR 11/04/2022 92.9  >60.0 mL/min/1.73 Final    National Kidney Foundation and American Society of Nephrology (ASN) Task Force recommended calculation based on the Chronic Kidney Disease Epidemiology Collaboration (CKD-EPI) equation refit without adjustment for race.   • ARETHA 11/04/2022 Positive (A)   Final                                         Negative   <1:80                                       Borderline  1:80                                       Positive   >1:80   • Please note 11/04/2022 Comment   Final    ARETHA Multiplex methodology  was designed to detect up to 11 antibodies  of the 100+ antibodies that may be detected by ARETHA IFA methodology.   • Bilirubin, Direct 11/04/2022 <0.2  0.0 - 0.3 mg/dL Final   • Liver Fraction: 11/04/2022 1.2  0.0 - 20.0 Units Final                                    Negative    0.0 - 20.0                                  Equivocal  20.1 - 24.9                                  Positive         >24.9  LKM type 1 antibodies are detected in patients with  autoimmune hepatitis type 2 and in up to 8% of  patients with chronic HCV infection.   • Smooth Muscle Ab 11/04/2022 12  0 - 19 Units Final                     Negative                     0 - 19                   Weak positive               20 - 30                   Moderate to strong positive     >30   Actin Antibodies are found in 52-85% of patients with   autoimmune hepatitis or chronic active hepatitis and   in 22% of patients with primary biliary cirrhosis.   • Homogeneous Pattern 11/04/2022 1:160 (H)   Final    ICAP nomenclature: AC-1   • Note: (Reference) 11/04/2022 Comment   Final    Comment: For more information about Hep-2 cell patterns use  ANApatterns.org, the official website for the International  Consensus on Antinuclear Antibody (ARETHA) Patterns (ICAP).  ------------------------------------------------------------  A positive ARETHA result may occur in healthy individuals (low  titer) or be associated with a variety of diseases.  See  interpretation chart which is not all inclusive:  Pattern      Antigen Detected  Suggested Disease Association  -----------  ----------------  -----------------------------  Homogeneous  DNA(ds,ss),       SLE - High titers               Nucleosomes,               Histones          Drug-induced SLE  -----------  ----------------  -----------------------------  Speckled     Sm, RNP, SCL-70,  SLE,MCTD,PSS (diffuse form),               SS-A/SS-B         Sjogrens  -----------  ----------------   -----------------------------  Nucleolar    SCL-70, PM-1/SCL  High titers Scleroderma,                                 PM/DM  -----------                             ----------------  -----------------------------  Centromere   Centromere        PSS (limited form) w/Crest                                 syndrome variable  -----------  ----------------  -----------------------------  Nuclear Dot  [id number]  Primary Biliary Cirrhosis  -----------  ----------------  -----------------------------  Nuclear      ,            Primary Biliary Cirrhosis  Membrane     roque A,B,C  -----------  ----------------  -----------------------------   • Anti-DNA (DS) Ab Qn 11/04/2022 2  0 - 9 IU/mL Final                                       Negative      <5                                     Equivocal  5 - 9                                     Positive      >9   • RNP Antibodies 11/04/2022 <0.2  0.0 - 0.9 AI Final   • Juarez Antibodies 11/04/2022 <0.2  0.0 - 0.9 AI Final   • Antiscleroderma-70 Antibodies 11/04/2022 0.4  0.0 - 0.9 AI Final   • DEBBIE SSA (RO) Ab 11/04/2022 <0.2  0.0 - 0.9 AI Final   • DEBBIE SSB (LA) Ab 11/04/2022 <0.2  0.0 - 0.9 AI Final   • Antichromatin Antibodies 11/04/2022 <0.2  0.0 - 0.9 AI Final   • NICCI-1 IgG 11/04/2022 <0.2  0.0 - 0.9 AI Final   • Anti-Centromere B Antibodies 11/04/2022 <0.2  0.0 - 0.9 AI Final   • See below: 11/04/2022 Comment   Final    Comment: Autoantibody                       Disease Association  ------------------------------------------------------------                          Condition                  Frequency  ---------------------   ------------------------   ---------  Antinuclear Antibody,    SLE, mixed connective  Direct (ARTEHA-D)           tissue diseases  ---------------------   ------------------------   ---------  dsDNA                    SLE                        40 - 60%  ---------------------   ------------------------   ---------  Chromatin                Drug  induced SLE                90%                           SLE                        48 - 97%  ---------------------   ------------------------   ---------  SSA (Ro)                 SLE                        25 - 35%                           Sjogren's Syndrome         40 - 70%                            Lupus                 100%  ---------------------   ------------------------   ---------  SSB (La)                 SLE                                                        10%                           Sjogren's Syndrome              30%  ---------------------   -----------------------    ---------  Sm (anti-Smith)          SLE                        15 - 30%  ---------------------   -----------------------    ---------  RNP                      Mixed Connective Tissue                           Disease                         95%  (U1 nRNP,                SLE                        30 - 50%  anti-ribonucleoprotein)  Polymyositis and/or                           Dermatomyositis                 20%  ---------------------   ------------------------   ---------  Scl-70 (antiDNA          Scleroderma (diffuse)      20 - 35%  topoisomerase)           Crest                           13%  ---------------------   ------------------------   ---------  Tara-1                     Polymyositis and/or                           Dermatomyositis            20 - 40%  ---------------------   ------------------------   ---------  Centromere B             Scleroderma -                            Crest                           variant                         80%      MRI Brain Without Contrast  Narrative: EXAM:  MR BRAIN WITHOUT IV CONTRAST    ORDERING PROVIDER:  KWAKU DENIS    CLINICAL HISTORY:   Headache    COMPARISON:     TECHNIQUE:   Axial T1-weighted, T2-weighted and diffusion weighted, FLAIR, and  sagittal T1-weighted images were obtained without administration  of gadolinium .    FINDINGS:    CEREBRAL PARENCHYMA: No  abnormal signal. No encephalomalacia,  mass or gliosis. No atrophy.      POSTERIOR FOSSA: No mass or abnormal signal. Unremarkable  craniocervical junction.    EXTRA-AXIAL SPACES: No mass.     PITUITARY: No mass or parasellar abnormality.    ORBITS: No mass. Unremarkable extraocular muscles, globe and  optic nerve.    CALVARIA AND SOFT TISSUES:  No mass, adenopathy, or abnormal  signal.    TEMPORAL BONE AND SKULL BASE: Unremarkable mastoid air cells,  inner and middle ear.    PARANASAL SINUSES: Scattered mucoperiosteal thickening of the  aerated sinuses, most significant in bilateral maxillary sinuses  and to a lesser extent in bilateral ethmoidal and right frontal  sinuses.     VASCULAR STRUCTURES: Expected flow voids. No aneurysm, stenosis  or dissection seen.    DIFFUSION WEIGHTED IMAGES: No restricted diffusion.  Impression: acute intracranial process.  Scattered mucoperiosteal thickening of the aerated sinuses, most  significant in bilateral maxillary sinuses and to a lesser extent  in bilateral ethmoidal, and right frontal sinuses.     Electronically signed by:  Demar Willams MD  8/9/2022 9:32 PM CDT  Workstation: [id number]      @[email or web address]@  Immunization History   Administered Date(s) Administered   • COVID-19 (MODERNA) 1st, 2nd, 3rd Dose Only 11/28/2021, 03/14/2022   • DTP 1989, 1989, 1989, 07/22/1992, 08/06/1993   • Hep B, Adolescent or Pediatric 10/02/2006   • HiB 07/22/1992, 08/06/1993   • MMR 05/11/1990, 08/30/1994   • OPV 1989, 1989, 1989, 08/06/1993   • Td 10/02/2006   • Tdap 09/19/2016     The following portions of the patient's history were reviewed and updated as appropriate: allergies, current medications, past family history, past medical history, past social history, past surgical history and problem list.    PHQ-9 Total Score:           Physical Exam  Constitutional:       Appearance: Normal appearance.   HENT:      Head: Normocephalic and atraumatic.      Right  Ear: External ear normal.      Left Ear: External ear normal.   Eyes:      General:         Right eye: No discharge.         Left eye: No discharge.      Conjunctiva/sclera: Conjunctivae normal.   Cardiovascular:      Rate and Rhythm: Normal rate and regular rhythm.      Pulses: Normal pulses.      Heart sounds: Normal heart sounds. No murmur heard.  Pulmonary:      Effort: Pulmonary effort is normal. No respiratory distress.      Breath sounds: Normal breath sounds.   Abdominal:      General: There is no distension.      Palpations: Abdomen is soft.      Tenderness: There is no abdominal tenderness.   Musculoskeletal:      Cervical back: Normal range of motion.      Right lower leg: No edema.      Left lower leg: No edema.   Lymphadenopathy:      Cervical: No cervical adenopathy.   Neurological:      Mental Status: She is alert. Mental status is at baseline.   Psychiatric:         Mood and Affect: Mood normal.         Behavior: Behavior normal.       Assessment & Plan    Diagnosis Plan   1. Migraine without aura and with status migrainosus, not intractable  ubrogepant (Ubrelvy) 100 MG tablet    SUMAtriptan (Imitrex) 100 MG tablet    Comprehensive Metabolic Panel      2. New onset headache        3. At risk for sleep apnea  Ambulatory Referral to Sleep Medicine      4. Seasonal allergies  fluticasone (FLONASE) 50 MCG/ACT nasal spray    loratadine (Claritin) 10 MG tablet      5. Hypothyroidism, unspecified type  TSH Rfx On Abnormal To Free T4      6. Elevated LFTs  Comprehensive Metabolic Panel      7. Migraine without aura and without status migrainosus, not intractable  amitriptyline (ELAVIL) 10 MG tablet      8. Labile mood  amitriptyline (ELAVIL) 10 MG tablet      9. Hypothyroidism associated with surgical procedure  Synthroid 200 MCG tablet      10. Herpes simplex vulvovaginitis  valACYclovir (VALTREX) 500 MG tablet         Orders Placed This Encounter   Procedures   • Comprehensive Metabolic Panel     Order  Specific Question:   Release to patient     Answer:   Immediate   • TSH Rfx On Abnormal To Free T4   • Ambulatory Referral to Sleep Medicine     Referral Priority:   Routine     Referral Type:   Consultation     Number of Visits Requested:   1     Sore throat; tested for strep today, negative, likely postnasal drip secondary to allergies, counseled patient, after discussion, will start on Flonase, Claritin as above.    Elevated LFTs; no red flags on exam, will repeat labs as above, low threshold to refer to gastroenterology for further management.    Migraine; Imitrex partially effective, given samples of Nurtec, Ubrelvy.  Has not taken amitriptyline, counseled on benefits, risks of medication, patient agreeable to starting medication.  We will resend to pharmacy.    Depression; started on amitriptyline as above, encourage follow-up with psychiatry, has not been able to obtain appointment with him.  Given strict ER/suicide precautions.  Follow-up in 1 month or sooner as needed.        This document has been electronically signed by James Angulo MD on April 14, 2023 09:07 CDT    EMR Dragon/Transciption Disclaimer: Some of this note may be an electronic transcription/translation of spoken language to printed text.  The electronic translation of spoken language may permit erroneous, or at times, nonsensical words or phrases to be inadvertently transcribed. Although I have reviewed the note for such errors, some may still exist.

## 2023-04-15 LAB
ALBUMIN SERPL-MCNC: 4.2 G/DL (ref 3.5–5.2)
ALBUMIN/GLOB SERPL: 1.4 G/DL
ALP SERPL-CCNC: 76 U/L (ref 39–117)
ALT SERPL W P-5'-P-CCNC: 34 U/L (ref 1–33)
ANION GAP SERPL CALCULATED.3IONS-SCNC: 8 MMOL/L (ref 5–15)
AST SERPL-CCNC: 27 U/L (ref 1–32)
BILIRUB SERPL-MCNC: 0.4 MG/DL (ref 0–1.2)
BUN SERPL-MCNC: 13 MG/DL (ref 6–20)
BUN/CREAT SERPL: 13.4 (ref 7–25)
CALCIUM SPEC-SCNC: 8.9 MG/DL (ref 8.6–10.5)
CHLORIDE SERPL-SCNC: 103 MMOL/L (ref 98–107)
CO2 SERPL-SCNC: 26 MMOL/L (ref 22–29)
CREAT SERPL-MCNC: 0.97 MG/DL (ref 0.57–1)
EGFRCR SERPLBLD CKD-EPI 2021: 78.8 ML/MIN/1.73
GLOBULIN UR ELPH-MCNC: 3.1 GM/DL
GLUCOSE SERPL-MCNC: 83 MG/DL (ref 65–99)
POTASSIUM SERPL-SCNC: 4.3 MMOL/L (ref 3.5–5.2)
PROT SERPL-MCNC: 7.3 G/DL (ref 6–8.5)
SODIUM SERPL-SCNC: 137 MMOL/L (ref 136–145)
T4 FREE SERPL-MCNC: 0.89 NG/DL (ref 0.93–1.7)
TSH SERPL DL<=0.05 MIU/L-ACNC: 11.2 UIU/ML (ref 0.27–4.2)

## 2023-04-24 ENCOUNTER — TELEPHONE (OUTPATIENT)
Dept: FAMILY MEDICINE CLINIC | Facility: CLINIC | Age: 33
End: 2023-04-24
Payer: MEDICAID

## 2023-04-24 NOTE — TELEPHONE ENCOUNTER
Spoke with pt in regards to lab results.   Pt stated she does take her medication daily on an empty stomach, however, may not be a the same time each day.    Please call pt with any medication changes or recommendations.

## 2023-04-24 NOTE — TELEPHONE ENCOUNTER
----- Message from James Angulo MD sent at 4/23/2023  9:32 PM CDT -----  Thyroid levels low, may benefit from increased dose of medication if you have been consistently taking it.

## 2023-04-24 NOTE — PROGRESS NOTES
Thyroid levels low, may benefit from increased dose of medication if you have been consistently taking it.

## 2023-06-09 ENCOUNTER — TELEPHONE (OUTPATIENT)
Dept: FAMILY MEDICINE CLINIC | Facility: CLINIC | Age: 33
End: 2023-06-09
Payer: MEDICAID

## 2023-06-09 NOTE — TELEPHONE ENCOUNTER
Patient called stating [facility] pharmacy is sending over a request for prior authorization on a few of her medications and asked if someone could take care of it then call her to let her know as she's needing them.  Call back number: [phone]

## 2023-06-13 ENCOUNTER — TELEPHONE (OUTPATIENT)
Dept: FAMILY MEDICINE CLINIC | Facility: CLINIC | Age: 33
End: 2023-06-13
Payer: MEDICAID

## 2023-06-13 NOTE — TELEPHONE ENCOUNTER
Called patient to let her know she needs to schedule an appointment in order to get refills.  She also asked about weight loss injections. Informed her she needs an appointment. Patient verbalized understanding.

## 2023-06-16 ENCOUNTER — PRIOR AUTHORIZATION (OUTPATIENT)
Dept: FAMILY MEDICINE CLINIC | Facility: CLINIC | Age: 33
End: 2023-06-16
Payer: MEDICAID

## 2023-06-19 ENCOUNTER — OFFICE VISIT (OUTPATIENT)
Dept: SLEEP MEDICINE | Facility: CLINIC | Age: 33
End: 2023-06-19
Payer: MEDICAID

## 2023-06-19 VITALS
DIASTOLIC BLOOD PRESSURE: 102 MMHG | BODY MASS INDEX: 47.09 KG/M2 | OXYGEN SATURATION: 98 % | WEIGHT: 293 LBS | HEIGHT: 66 IN | SYSTOLIC BLOOD PRESSURE: 136 MMHG | HEART RATE: 95 BPM

## 2023-06-19 DIAGNOSIS — R06.83 SNORING: Primary | ICD-10-CM

## 2023-06-19 DIAGNOSIS — E66.01 MORBID (SEVERE) OBESITY DUE TO EXCESS CALORIES: ICD-10-CM

## 2023-06-19 DIAGNOSIS — F51.04 PSYCHOPHYSIOLOGICAL INSOMNIA: ICD-10-CM

## 2023-06-19 DIAGNOSIS — G47.10 HYPERSOMNIA: ICD-10-CM

## 2023-06-19 PROCEDURE — 99213 OFFICE O/P EST LOW 20 MIN: CPT | Performed by: NURSE PRACTITIONER

## 2023-06-19 PROCEDURE — 1159F MED LIST DOCD IN RCRD: CPT | Performed by: NURSE PRACTITIONER

## 2023-06-19 PROCEDURE — 1160F RVW MEDS BY RX/DR IN RCRD: CPT | Performed by: NURSE PRACTITIONER

## 2023-06-19 NOTE — PROGRESS NOTES
New Patient Sleep Medicine Consultation    Encounter Date: 6/19/2023         Patient's PCP: James Angulo MD  Referring provider: James Angulo MD  Reason for consultation chief complaint: snoring, loud disruptive snoring, excessive daytime sleepiness, unrefreshing sleep, and insomnia    Sherrie Smith is a 33 y.o. female whose bedtime is ~ 6838-3350. She  falls asleep after 45-90 minutes, and is up 0-1 times per night. She wakes up ~ 6096-0377. She endorses 6 hours of sleep. She drinks 0-1 cups of coffee, several teas, and 0-1 sodas per day. She drinks 0-1 alcoholic beverages per week.      Sherrie Smith admits to snoring, unrestful sleep, excessive daytime sleepiness, morning headaches, and difficulty falling asleep for several years. She denies cataplexy, sleep paralysis, or hypnagogic hallucinations. She takes no medicine for sleep. She has no sleepiness with driving. She naps no days. She has never had a sleep study. She sleeps in a bed alone.     She is a never smoker.       Marital status: single   Occupation: medical records   Children: 1  FH of sleep disorders: father has sleep apnea       Past Medical History:   Diagnosis Date    Bunion     Endometriosis     Family history of thyroid problem     Hammer toe     Hyperlipidemia     Hypothyroidism     Migraine      Social History     Socioeconomic History    Marital status: Single   Tobacco Use    Smoking status: Former     Types: Cigarettes    Smokeless tobacco: Never   Vaping Use    Vaping Use: Never used   Substance and Sexual Activity    Alcohol use: Never    Drug use: Never    Sexual activity: Yes     Partners: Male     Birth control/protection: Condom     Family History   Problem Relation Age of Onset    Hyperlipidemia Mother     Thyroid disease Mother     Atrial fibrillation Father     Heart disease Father     Prostate cancer Father     Thyroid disease Maternal Grandmother     Melanoma Paternal Grandfather          Review of  Systems:  Constitutional: positive for fatigue  Eyes: negative  Ears, nose, mouth, throat, and face: positive for snoring  Respiratory: negative  Cardiovascular: negative  Gastrointestinal: negative  Genitourinary:negative  Integument/breast: negative  Hematologic/lymphatic: negative  Musculoskeletal:negative  Neurological: negative  Behavioral/Psych: negative  Endocrine: negative  Allergic/Immunologic: negative Patient advised to discuss any positive ROS with PCP.        [place] Sleepiness Scale Score: 11    How likely are you to doze off or fall asleep in the following situation, in contrast to feeling just tired?     Use the following scale to choose the most appropriate number for each situation:    0 = would never doze  1 = slight chance of dozing   2 = moderate chance of dozing   3 = high chance of dozing    It is important that you answer each question as best you can.      Situation       Chance of Dozing (0-3)    Sitting and reading            ___3____    Watching TV          ___1____    Sitting, inactive in a public place (e.g. a theatre or meeting)   ___0____    As a passenger in a car for an hour without break    ___3____    Lying down to rest in the afternoon, when circumstances permit  ___1____    Sitting and talking to someone      ___0____    Sitting quietly after a lunch without alcohol     ___3____    In a car, while stopped for a few minutes in traffic    ___0____         Vitals:    06/19/23 1416   BP: (!) 136/102   Pulse: 95   SpO2: 98%           06/19/23  1416   Weight: (!) 143 kg (315 lb)       Body mass index is 50.84 kg/m². Class 3 Severe Obesity (BMI >=40). Obesity-related health conditions include the following: obstructive sleep apnea. Obesity is unchanged. BMI is is above average; BMI management plan is completed. We discussed portion control and increasing exercise.              General: Alert. Cooperative. Well developed. No acute distress.             Head:  Normocephalic. Symmetrical.  Atraumatic.              Eyes: Sclera clear. No icterus. Normal EOM.             Ears: No deformities. Normal hearing.             Nose: No septal deviation. No drainage.          Throat: No oral lesions. No thrush. Moist mucous membranes. Trachea midline    Tongue is normal    Dentition is fair       Pharynx: Posterior pharyngeal pillars are narrow    Mallampati score of III (soft and hard palate and base of uvula visible)    Pharynx is nonerythematous   Chest Wall:  Normal shape. Symmetric expansion with respiration. No tenderness.          Lungs:  Clear to auscultation bilaterally. No wheezes. No rhonchi. No rales. Respirations regular, even and unlabored.            Heart:  Regular rhythm and normal rate. Normal S1 and S2. No murmur.  Extremities:  Moves all extremities well. No edema.               Skin: Dry. Intact. No bleeding. No rash.           Neuro: Moves all 4 extremities and cranial nerves grossly intact.  Psychiatric: Normal mood and affect.      Current Outpatient Medications:     amitriptyline (ELAVIL) 10 MG tablet, TAKE 1 TABLET BY MOUTH EVERY NIGHT. MAY INCREASE TO 2 TABLETS EVERY NIGHT AS NEEDED/ TOLERATED, Disp: 90 tablet, Rfl: 0    Cholecalciferol (Vitamin D-3) 25 MCG (1000 UT) capsule, Take 2,000 Units by mouth Daily., Disp: 60 capsule, Rfl: 5    Cyanocobalamin 1000 MCG/ML kit, Administer injection IM or SQ 3 times weekly x 2 weeks and then once monthly injections, Disp: 1 kit, Rfl: 11    fluticasone (FLONASE) 50 MCG/ACT nasal spray, 2 sprays into the nostril(s) as directed by provider Daily., Disp: 18.2 mL, Rfl: 3    furosemide (LASIX) 20 MG tablet, furosemide 20 mg tablet  TAKE ONE TABLET AS NEEDED FOR swelling as needed but not more THAN ONCE PER DAY, Disp: , Rfl:     Linzess 145 MCG capsule capsule, , Disp: , Rfl:     loratadine (Claritin) 10 MG tablet, Take 1 tablet by mouth Daily., Disp: 90 tablet, Rfl: 1    naproxen (NAPROSYN) 500 MG tablet, , Disp: , Rfl:     SUMAtriptan (Imitrex) 100  MG tablet, Take 1 tablet by mouth 1 (One) Time As Needed for Migraine. Take one tablet at onset of headache. May repeat dose one time in 2 hours if headache not relieved., Disp: 10 tablet, Rfl: 1    Synthroid 200 MCG tablet, Take 1 tablet by mouth Daily., Disp: 90 tablet, Rfl: 1    ubrogepant (Ubrelvy) 100 MG tablet, Take 1 tablet by mouth 1 (One) Time As Needed (migraine) for up to 1 dose., Disp: 10 tablet, Rfl: 0    valACYclovir (VALTREX) 500 MG tablet, 500 mg twice daily for 3 days within 1 day of lesion onset, Disp: 30 tablet, Rfl: 3    Lab Results   Component Value Date    WBC 6.02 09/16/2022    HGB 12.8 09/16/2022    HCT 38.0 09/16/2022    MCV 91.1 09/16/2022     09/16/2022     Lab Results   Component Value Date    GLUCOSE 83 04/14/2023    CALCIUM 8.9 04/14/2023     04/14/2023    K 4.3 04/14/2023    CO2 26.0 04/14/2023     04/14/2023    BUN 13 04/14/2023    CREATININE 0.97 04/14/2023    EGFRIFNONA 84 10/14/2021    BCR 13.4 04/14/2023    ANIONGAP 8.0 04/14/2023     No results found for: INR, PROTIME  No results found for: CKTOTAL, CKMB, CKMBINDEX, TROPONINI, TROPONINT    No results found for: PHART, XPT3KBR, PO2ART]    Contraindications to home sleep test: none    Assessment and Plan:    Excessive daytime sleepiness- New to me, additional work-up planned   Check HST  Good sleep hygiene   Pertinent labs reviewed   Drowsy driving tips- do not drive if feeling sleepy   RTC in 2 weeks with study results   2.   Snoring- New to me, additional work-up planned    1.   As above   3.   Morbid obesity- BMI 50.9- chronic, stable  Recommend healthy diet   4.   Insomnia - sleep onset and or maintenance - new to me, additional work-up planned    1.   Check HST    2.   Good sleep hygiene       I obtained a brief history from the patient, reviewed the medical problems and current medications, and made medical decisions regarding treatment based on that information.   I spent 22 minutes caring for Sherrie on  this date of service. This time includes time spent by me in the following activities: preparing for the visit, obtaining and/or reviewing a separately obtained history, performing a medically appropriate examination and/or evaluation, counseling and educating the patient/family/caregiver, ordering medications, tests, or procedures, and documenting information in the medical record. I answered all of her questions and she verbalized understanding. Discussion involved sleep apnea, possible health consequences of sleep apnea, home sleep testing, PAP therapy and follow-up.           RTC 2 weeks after study for results.             This document has been electronically signed by ABIOLA Chandra on June 19, 2023 14:24 CDT          This document has been electronically signed by ABIOLA Chandra on June 19, 2023         CC: James Angulo MD Moody, Leo James, MD

## 2023-07-17 PROBLEM — G47.33 OSA (OBSTRUCTIVE SLEEP APNEA): Status: ACTIVE | Noted: 2023-07-17

## 2023-09-05 ENCOUNTER — PRIOR AUTHORIZATION (OUTPATIENT)
Dept: FAMILY MEDICINE CLINIC | Facility: CLINIC | Age: 33
End: 2023-09-05
Payer: MEDICAID

## 2023-09-26 ENCOUNTER — OFFICE VISIT (OUTPATIENT)
Dept: FAMILY MEDICINE CLINIC | Facility: CLINIC | Age: 33
End: 2023-09-26
Payer: MEDICAID

## 2023-09-26 VITALS
WEIGHT: 293 LBS | DIASTOLIC BLOOD PRESSURE: 78 MMHG | HEART RATE: 68 BPM | SYSTOLIC BLOOD PRESSURE: 128 MMHG | TEMPERATURE: 98 F | OXYGEN SATURATION: 99 % | BODY MASS INDEX: 47.09 KG/M2 | HEIGHT: 66 IN

## 2023-09-26 DIAGNOSIS — E55.9 VITAMIN D DEFICIENCY: ICD-10-CM

## 2023-09-26 DIAGNOSIS — M79.10 MYALGIA: ICD-10-CM

## 2023-09-26 DIAGNOSIS — E53.8 VITAMIN B12 DEFICIENCY: ICD-10-CM

## 2023-09-26 DIAGNOSIS — E89.0 HYPOTHYROIDISM ASSOCIATED WITH SURGICAL PROCEDURE: Primary | ICD-10-CM

## 2023-09-26 PROCEDURE — 1159F MED LIST DOCD IN RCRD: CPT | Performed by: STUDENT IN AN ORGANIZED HEALTH CARE EDUCATION/TRAINING PROGRAM

## 2023-09-26 PROCEDURE — 1160F RVW MEDS BY RX/DR IN RCRD: CPT | Performed by: STUDENT IN AN ORGANIZED HEALTH CARE EDUCATION/TRAINING PROGRAM

## 2023-09-26 PROCEDURE — 99213 OFFICE O/P EST LOW 20 MIN: CPT | Performed by: STUDENT IN AN ORGANIZED HEALTH CARE EDUCATION/TRAINING PROGRAM

## 2023-09-26 RX ORDER — LEVOTHYROXINE SODIUM 200 MCG
200 TABLET ORAL DAILY
Qty: 90 TABLET | Refills: 1 | Status: SHIPPED | OUTPATIENT
Start: 2023-09-26

## 2023-09-26 NOTE — PROGRESS NOTES
"Subjective:  Sherrie Smith is a 33 y.o. female who presents for     Hypothyroidism; patient currently on Synthroid 200 micrograms daily, states that she has not been adherent with medication because she thought it was causing her to have a dry mouth.      Muscle cramping; patient states that for the past year, has been getting worsening of her muscle cramps.  States that it is worse in her legs but now is all over.  Did have a positive ARETHA in the past, was referred to rheumatology, states that has not seen rheumatology.  Additionally states that he was receiving B12 injections but has not had one for years.    Patient Active Problem List   Diagnosis    Hypothyroidism associated with surgical procedure    Hypothyroidism    Endometriosis    Diffuse thyroid goiter without thyrotoxicosis    SOTO (obstructive sleep apnea)     Vitals:    Vitals:    09/26/23 1302   BP: 128/78   Pulse: 68   Temp: 98 °F (36.7 °C)   TempSrc: Oral   SpO2: 99%   Weight: (!) 145 kg (320 lb 8 oz)   Height: 167.6 cm (65.98\")     Body mass index is 51.76 kg/m².      Current Outpatient Medications:     amitriptyline (ELAVIL) 10 MG tablet, TAKE 1 TABLET BY MOUTH EVERY NIGHT. MAY INCREASE TO 2 TABLETS EVERY NIGHT AS NEEDED/ TOLERATED, Disp: 90 tablet, Rfl: 0    Cholecalciferol (Vitamin D-3) 25 MCG (1000 UT) capsule, Take 2,000 Units by mouth Daily., Disp: 60 capsule, Rfl: 5    Cyanocobalamin 1000 MCG/ML kit, Administer injection IM or SQ 3 times weekly x 2 weeks and then once monthly injections, Disp: 1 kit, Rfl: 11    fluticasone (FLONASE) 50 MCG/ACT nasal spray, 2 sprays into the nostril(s) as directed by provider Daily., Disp: 18.2 mL, Rfl: 3    furosemide (LASIX) 20 MG tablet, Take 1 tablet by mouth Daily As Needed (leg swelling)., Disp: 90 tablet, Rfl: 0    Linzess 145 MCG capsule capsule, , Disp: , Rfl:     loratadine (Claritin) 10 MG tablet, Take 1 tablet by mouth Daily., Disp: 90 tablet, Rfl: 1    naproxen (NAPROSYN) 500 MG tablet, , Disp: , " Rfl:     Synthroid 200 MCG tablet, Take 1 tablet by mouth Daily., Disp: 90 tablet, Rfl: 1    ubrogepant (Ubrelvy) 100 MG tablet, Take 1 tablet by mouth 1 (One) Time As Needed (migraine)., Disp: 10 tablet, Rfl: 0    valACYclovir (VALTREX) 500 MG tablet, 500 mg twice daily for 3 days within 1 day of lesion onset, Disp: 30 tablet, Rfl: 3    Patient Active Problem List   Diagnosis    Hypothyroidism associated with surgical procedure    Hypothyroidism    Endometriosis    Diffuse thyroid goiter without thyrotoxicosis    SOTO (obstructive sleep apnea)     Past Surgical History:   Procedure Laterality Date    BUNIONECTOMY      LAPAROSCOPY DIAGNOSTIC / BIOPSY / ASPIRATION / LYSIS      THYROIDECTOMY       Social History     Socioeconomic History    Marital status: Single   Tobacco Use    Smoking status: Former     Types: Cigarettes    Smokeless tobacco: Never   Vaping Use    Vaping Use: Never used   Substance and Sexual Activity    Alcohol use: Never    Drug use: Never    Sexual activity: Yes     Partners: Male     Birth control/protection: Condom     Family History   Problem Relation Age of Onset    Hyperlipidemia Mother     Thyroid disease Mother     Atrial fibrillation Father     Heart disease Father     Prostate cancer Father     Thyroid disease Maternal Grandmother     Melanoma Paternal Grandfather      Office Visit on 04/14/2023   Component Date Value Ref Range Status    Glucose 04/14/2023 83  65 - 99 mg/dL Final    BUN 04/14/2023 13  6 - 20 mg/dL Final    Creatinine 04/14/2023 0.97  0.57 - 1.00 mg/dL Final    Sodium 04/14/2023 137  136 - 145 mmol/L Final    Potassium 04/14/2023 4.3  3.5 - 5.2 mmol/L Final    Chloride 04/14/2023 103  98 - 107 mmol/L Final    CO2 04/14/2023 26.0  22.0 - 29.0 mmol/L Final    Calcium 04/14/2023 8.9  8.6 - 10.5 mg/dL Final    Total Protein 04/14/2023 7.3  6.0 - 8.5 g/dL Final    Albumin 04/14/2023 4.2  3.5 - 5.2 g/dL Final    ALT (SGPT) 04/14/2023 34 (H)  1 - 33 U/L Final    AST (SGOT)  04/14/2023 27  1 - 32 U/L Final    Alkaline Phosphatase 04/14/2023 76  39 - 117 U/L Final    Total Bilirubin 04/14/2023 0.4  0.0 - 1.2 mg/dL Final    Globulin 04/14/2023 3.1  gm/dL Final    A/G Ratio 04/14/2023 1.4  g/dL Final    BUN/Creatinine Ratio 04/14/2023 13.4  7.0 - 25.0 Final    Anion Gap 04/14/2023 8.0  5.0 - 15.0 mmol/L Final    eGFR 04/14/2023 78.8  >60.0 mL/min/1.73 Final    TSH 04/14/2023 11.200 (H)  0.270 - 4.200 uIU/mL Final    Free T4 04/14/2023 0.89 (L)  0.93 - 1.70 ng/dL Final      Home Sleep Study  Images from the original result were not included.    [facility]  [address] [place] [address]  Phone: [phone]  Fax: [phone]    Home Sleep Study Interpretation    Patient's Name: Sherrie Smith  YOB: 1990   Date of Study: 26 June 2023  Referring Provider: ABIOLA Molina  Primary Care Provider: James Angulo MD    History of Presenting Illness:  Ms. Sherrie Smith is a 33 y.o. female.    Concurrent medical history includes Obesity w/ bicarb of 26, elevated   blood pressure.  Concurrent medications include Elavil PRN.  Patient   presents for a polysomnogram for complaints of loud disruptive snoring,   excessive daytime sleepiness, unrefreshing sleep.    Methods: Patient had home sleep testing with a WatchPat device.  It   measured: Oxygen saturation and pulse rate were recorded by a pulse   oximeter; snoring by transducer vibration; body position.  The sleep study   was scored using standard techniques.     Technical Comment: Technically adequate.      Diagnostic Analysis:  --Total Sleep Time: 05 hours, 40 min    --Respiratory Indices:  -->  pRDI (4%): 20 / hr  -->  pAHI (4%): 12 / hr  -->  DALTON: 12 / hr  -->  pAHI-Central: 00 / hr  --> % :  00 %    --Oxygen Saturation (SpO2):  --> Averaged 94% during the study.   --> Vijay of 82% during sleep.   -->  Throughout the study: 01 minutes spent with a saturation  less than or   equal to 88%.  -->  Cyclical desaturations with good recovery were observed.  -->  No sustained time with SpO2 < 88% for five minutes or longer.    --Pulse:  -Averaged 64 bpm   -Range: 46 bpm -- 98 bpm.     --Time spent supine: 37%  --Snoring occupied 14% total sleep time.     Assessment:  Ms. Sherrie Smith is a 33 y.o. female whose home sleep test   reveals:  Sleep Disordered Breathing / Obstructive Sleep Apnea with a respiratory   events index of at least a mild to moderate severity.  Home sleep testing generally under estimates the severity of sleep   disordered breathing.    No sustained hypoxia observed during the study.       Planning & Recommendations:  Follow-up with the Sleep Medicine Clinic for full discussion of results   and treatment planning.  Consideration for Auto-CPAP with heated humidification and a mask per   patient’s choice from DME of patient's choice.  Low threshold for future in lab PAP titration for any difficulties with   use or concerns with PAP download/compliance report.  Follow-up in the sleep disorders clinic with in 4 - 12 weeks, or sooner,   if needed, after the initiation of positive pressure therapy to monitor   effectiveness and compliance.  Monitor blood pressure and recommend continued follow-up with primary   care, Dr. Angulo.  Treatment of Sleep Disordered Breathing / Obstructive   Sleep Apnea may also benefit control of bloop pressure if hypertension is   present.   Call with any questions or concerns.    Ender Newton II, MD  Sleep Medicine   07/11/23   16:25 CDT    CC:   James Angulo MD Caron, Sydney, APRN      [email or web address]  Immunization History   Administered Date(s) Administered    COVID-19 (MODERNA) 1st,2nd,3rd Dose Monovalent 11/28/2021, 03/14/2022    DTP 1990, 1990, 1990, 07/22/1992, 08/06/1993    Hep B, Adolescent or Pediatric 10/02/2006    HiB 07/22/1992, 08/06/1993    MMR 05/11/1990, 08/30/1994    OPV 1989,  1989, 1989, 08/06/1993    Td (TDVAX) 10/02/2006    Tdap 09/19/2016     The following portions of the patient's history were reviewed and updated as appropriate: allergies, current medications, past family history, past medical history, past social history, past surgical history and problem list.    PHQ-9 Total Score:           Physical Exam  Constitutional:       Appearance: Normal appearance.   HENT:      Head: Normocephalic and atraumatic.      Right Ear: External ear normal.      Left Ear: External ear normal.   Eyes:      General:         Right eye: No discharge.         Left eye: No discharge.      Conjunctiva/sclera: Conjunctivae normal.   Cardiovascular:      Rate and Rhythm: Normal rate and regular rhythm.      Pulses: Normal pulses.      Heart sounds: Normal heart sounds. No murmur heard.  Pulmonary:      Effort: Pulmonary effort is normal. No respiratory distress.      Breath sounds: Normal breath sounds.   Abdominal:      General: There is no distension.      Palpations: Abdomen is soft.      Tenderness: There is no abdominal tenderness.   Musculoskeletal:      Cervical back: Normal range of motion.      Right lower leg: No edema.      Left lower leg: No edema.   Lymphadenopathy:      Cervical: No cervical adenopathy.   Neurological:      Mental Status: She is alert. Mental status is at baseline.   Psychiatric:         Mood and Affect: Mood normal.         Behavior: Behavior normal.     Assessment & Plan    Diagnosis Plan   1. Hypothyroidism associated with surgical procedure  CBC & Differential    Comprehensive Metabolic Panel    TSH Rfx On Abnormal To Free T4    Vitamin D,25-Hydroxy    Synthroid 200 MCG tablet      2. Myalgia  CBC & Differential    Comprehensive Metabolic Panel    TSH Rfx On Abnormal To Free T4      3. Vitamin B12 deficiency  Vitamin B12      4. Vitamin D deficiency  Vitamin D,25-Hydroxy         Orders Placed This Encounter   Procedures    Comprehensive Metabolic Panel      Order Specific Question:   Release to patient     Answer:   Routine Release [[id number]]    TSH Rfx On Abnormal To Free T4     Order Specific Question:   Release to patient     Answer:   Routine Release [[id number]]    Vitamin D,25-Hydroxy     Order Specific Question:   Release to patient     Answer:   Routine Release [[id number]]    Vitamin B12     Order Specific Question:   Release to patient     Answer:   Routine Release [[id number]]    CBC & Differential     Order Specific Question:   Manual Differential     Answer:   No     Order Specific Question:   Release to patient     Answer:   Routine Release [[id number]]     Myalgia; strongly suspect etiology is due to nonadherence to thyroid medication, counseled patient at length, states that she is not a good medication taker, discussed alarm, schedule, to take first thing in the morning prior to any other meal, medications.  Patient voiced understanding, agreeable plan.  Follow-up in 6 weeks for repeat labs, will adjust dose/medication if needed.          This document has been electronically signed by James Angulo MD on September 29, 2023 23:34 CDT    EMR Dragon/Transciption Disclaimer: Some of this note may be an electronic transcription/translation of spoken language to printed text.  The electronic translation of spoken language may permit erroneous, or at times, nonsensical words or phrases to be inadvertently transcribed. Although I have reviewed the note for such errors, some may still exist.

## 2024-03-19 NOTE — PROGRESS NOTES
"Subjective:  Sherrie Smith is a 32 y.o. female who presents for     HSV; Not having an outbreak currently but needs refill for future outbreaks.  Denies any history of medications, denies any vaginal discharge, dyspareunia, vaginal bleeding.    Hypothyroid; Currently on synthroid 200 mcg daily since May.  No issues medication, states that she is not having any symptoms, chest pain, shortness of breath, vision changes, numbness, tingling or weakness, abdominal pain, constipation.      Headache; states recently has been getting severe headaches.  Usually unilateral, present for several hours to days, better with rest, worse with bright lights, loud noises.  Does have associated vision changes, denied history of blood clots, stroke, denies any numbness, tingling, weakness, vomiting.  Does not wake from sleep.    Patient Active Problem List   Diagnosis   • Hypothyroidism associated with surgical procedure   • Hypothyroidism   • Endometriosis   • Diffuse thyroid goiter without thyrotoxicosis     Vitals:    Vitals:    08/04/22 1658   BP: 128/86   BP Location: Right arm   Patient Position: Sitting   Cuff Size: Large Adult   Pulse: 78   Temp: 96.9 °F (36.1 °C)   SpO2: 98%   Weight: (!) 138 kg (305 lb)   Height: 167.6 cm (66\")     Body mass index is 49.23 kg/m².    Current Outpatient Medications:   •  furosemide (LASIX) 20 MG tablet, furosemide 20 mg tablet  TAKE ONE TABLET AS NEEDED FOR swelling as needed but not more THAN ONCE PER DAY, Disp: , Rfl:   •  Linzess 145 MCG capsule capsule, , Disp: , Rfl:   •  naproxen (NAPROSYN) 500 MG tablet, , Disp: , Rfl:   •  valACYclovir (VALTREX) 500 MG tablet, 500 mg twice daily for 3 days within 1 day of lesion onset, Disp: 30 tablet, Rfl: 3  •  Cholecalciferol (Vitamin D-3) 25 MCG (1000 UT) capsule, Take 2,000 Units by mouth Daily., Disp: 60 capsule, Rfl: 5  •  Cyanocobalamin 1000 MCG/ML kit, Administer injection IM or SQ 3 times weekly x 2 weeks and then once monthly injections, " Disp: 1 kit, Rfl: 11  •  doxycycline (VIBRAMYCIN) 100 MG capsule, Take 1 capsule by mouth 2 (Two) Times a Day., Disp: 10 capsule, Rfl: 0  •  multivitamin with minerals tablet tablet, Take 1 tablet by mouth Daily., Disp: , Rfl:   •  Omega-3 Fatty Acids (fish oil) 1000 MG capsule capsule, Take 2 capsules by mouth Daily With Breakfast., Disp: 30 capsule, Rfl: 5  •  SUMAtriptan (Imitrex) 50 MG tablet, Take 1 tablet by mouth 1 (One) Time As Needed for Migraine. Take one tablet at onset of headache. May repeat dose one time in 2 hours if headache not relieved., Disp: 10 tablet, Rfl: 2  •  Synthroid 200 MCG tablet, Take 1 tablet by mouth Daily., Disp: 90 tablet, Rfl: 1    Patient Active Problem List   Diagnosis   • Hypothyroidism associated with surgical procedure   • Hypothyroidism   • Endometriosis   • Diffuse thyroid goiter without thyrotoxicosis     Past Surgical History:   Procedure Laterality Date   • BUNIONECTOMY     • LAPAROSCOPY DIAGNOSTIC / BIOPSY / ASPIRATION / LYSIS     • THYROIDECTOMY       Social History     Socioeconomic History   • Marital status: Single   Tobacco Use   • Smoking status: Former Smoker     Types: Cigarettes   • Smokeless tobacco: Never Used   Vaping Use   • Vaping Use: Never used   Substance and Sexual Activity   • Alcohol use: Never   • Drug use: Never   • Sexual activity: Yes     Partners: Male     Birth control/protection: Condom     Family History   Problem Relation Age of Onset   • Hyperlipidemia Mother    • Thyroid disease Mother    • Atrial fibrillation Father    • Heart disease Father    • Prostate cancer Father    • Thyroid disease Maternal Grandmother    • Melanoma Paternal Grandfather      Hospital Outpatient Visit on 06/17/2022   Component Date Value Ref Range Status   • Target HR (85%) 06/17/2022 159  bpm Final   • Max. Pred. HR (100%) 06/17/2022 187  bpm Final   • Right Common Femoral Augment 06/17/2022 Y   Final   • Right Common Femoral Compress 06/17/2022 C   Final   • Right  Saphenofemoral Junction Augm* 06/17/2022 Y   Final   • Right Saphenofemoral Junction Comp* 06/17/2022 C   Final   • Right Profunda Femoral Augment 06/17/2022 Y   Final   • Right Proximal Femoral Augment 06/17/2022 Y   Final   • Right Proximal Femoral Compress 06/17/2022 C   Final   • Right Mid Femoral Augment 06/17/2022 Y   Final   • Right Mid Femoral Compress 06/17/2022 C   Final   • Right Distal Femoral Augment 06/17/2022 Y   Final   • Right Distal Femoral Compress 06/17/2022 C   Final   • Right Popliteal Augment 06/17/2022 Y   Final   • Right Popliteal Compress 06/17/2022 C   Final   • Right Posterior Tibial Augment 06/17/2022 Y   Final   • Right Peroneal Augment 06/17/2022 Y   Final   • Right Greater Saph AK Augment 06/17/2022 Y   Final   • Right Greater Saph AK Competent 06/17/2022 Y   Final   • Right Greater Saph AK Compress 06/17/2022 C   Final   • Right Greater Saph BK Augment 06/17/2022 Y   Final   • Right Greater Saph BK Competent 06/17/2022 Y   Final   • Right Greater Saph BK Compress 06/17/2022 C   Final   • Left Common Femoral Augment 06/17/2022 Y   Final   • Left Common Femoral Compress 06/17/2022 C   Final   • Left Saphenofemoral Junction Augme* 06/17/2022 Y   Final   • Left Saphenofemoral Junction Compr* 06/17/2022 C   Final   • Left Profunda Femoral Augment 06/17/2022 Y   Final   • Left Proximal Femoral Augment 06/17/2022 Y   Final   • Left Proximal Femoral Compress 06/17/2022 C   Final   • Left Mid Femoral Augment 06/17/2022 Y   Final   • Left Mid Femoral Compress 06/17/2022 C   Final   • Left Distal Femoral Augment 06/17/2022 Y   Final   • Left Distal Femoral Compress 06/17/2022 C   Final   • Left Popliteal Augment 06/17/2022 Y   Final   • Left Popliteal Compress 06/17/2022 C   Final   • Left Posterior Tibial Augment 06/17/2022 Y   Final   • Left Peroneal Augment 06/17/2022 Y   Final   • Left Greater Saph AK Competent 06/17/2022 Y   Final   • Left Greater Saph AK Compress 06/17/2022 C   Final   •  Left Greater Saph BK Augment 06/17/2022 Y   Final   • Left Greater Saph BK Competent 06/17/2022 Y   Final   • Left Greater Saph BK Compress 06/17/2022 C   Final   Lab on 05/24/2022   Component Date Value Ref Range Status   • DHEA-Sulfate 05/24/2022 44.6 (A) 84.8 - 378.0 ug/dL Final   Office Visit on 05/24/2022   Component Date Value Ref Range Status   • TSH 05/24/2022 21.400 (A) 0.270 - 4.200 uIU/mL Final   • Free T4 05/24/2022 0.82 (A) 0.93 - 1.70 ng/dL Final   Office Visit on 03/14/2022   Component Date Value Ref Range Status   • Chlamydia DNA by PCR 03/14/2022 Negative  Negative Final   • Neisseria gonorrhoeae by PCR 03/14/2022 Negative  Negative Final   • Trichomonas vaginalis PCR 03/14/2022 Negative  Negative, Invalid Final   • Testosterone, Total 03/14/2022 32.2  10.0 - 55.0 ng/dL Final   • Testosterone, Free 03/14/2022 1.8  0.0 - 4.2 pg/mL Final   • Sex Hormone Binding Globulin 03/14/2022 25.0  24.6 - 122.0 nmol/L Final   • Prolactin 03/14/2022 8.02  4.79 - 23.30 ng/mL Final   • DHEA-Sulfate 03/14/2022 69.8 (A) 84.8 - 378.0 ug/dL Final   • 17-Hydroxyprogesterone 03/14/2022 42  ng/dL Final                              Adult Female                             Follicular        15 -  70                             Luteal            35 - 290   • Case Report 03/14/2022    Final                    Value:Gynecologic Cytology Report                       Case: [id number]                                  Authorizing Provider:  Nicole Shrestha APRN   Collected:           03/14/2022 03:27 PM          Ordering Location:     [facility]   Received:            03/14/2022 04:10 PM                                 [facility]                                                         First Screen:          Luis E Felton                                                             Specimen:    Sendout to [facility], [facility]                                                                    • HPV Report,  Addendum 03/14/2022    Final                    Value:This result contains rich text formatting which cannot be displayed here.   • Interpretation 03/14/2022 See attached report    Final   Office Visit on 02/24/2022   Component Date Value Ref Range Status   • Glucose 03/14/2022 88  65 - 99 mg/dL Final   • BUN 03/14/2022 11  6 - 20 mg/dL Final   • Creatinine 03/14/2022 1.00  0.57 - 1.00 mg/dL Final   • Sodium 03/14/2022 141  136 - 145 mmol/L Final   • Potassium 03/14/2022 4.3  3.5 - 5.2 mmol/L Final   • Chloride 03/14/2022 105  98 - 107 mmol/L Final   • CO2 03/14/2022 23.1  22.0 - 29.0 mmol/L Final   • Calcium 03/14/2022 9.4  8.6 - 10.5 mg/dL Final   • BUN/Creatinine Ratio 03/14/2022 11.0  7.0 - 25.0 Final   • Anion Gap 03/14/2022 12.9  5.0 - 15.0 mmol/L Final   • eGFR 03/14/2022 76.4  >60.0 mL/min/1.73 Final    National Kidney Foundation and American Society of Nephrology (ASN) Task Force recommended calculation based on the Chronic Kidney Disease Epidemiology Collaboration (CKD-EPI) equation refit without adjustment for race.   • TSH 03/14/2022 11.700 (A) 0.270 - 4.200 uIU/mL Final   • 25 Hydroxy, Vitamin D 03/14/2022 30.5  30.0 - 100.0 ng/ml Final   • Vitamin B-12 03/14/2022 521  211 - 946 pg/mL Final   • Hepatitis C Ab 03/14/2022 <0.1  0.0 - 0.9 s/co ratio Final   • Color, UA 03/14/2022 Yellow  Yellow, Straw Final   • Appearance, UA 03/14/2022 Clear  Clear Final   • pH, UA 03/14/2022 7.0  5.0 - 8.0 Final   • Specific Gravity, UA 03/14/2022 1.026  1.005 - 1.030 Final   • Glucose, UA 03/14/2022 Negative  Negative Final   • Ketones, UA 03/14/2022 Negative  Negative Final   • Bilirubin, UA 03/14/2022 Negative  Negative Final   • Blood, UA 03/14/2022 Negative  Negative Final   • Protein, UA 03/14/2022 Trace (A) Negative Final   • Leuk Esterase, UA 03/14/2022 Negative  Negative Final   • Nitrite, UA 03/14/2022 Negative  Negative Final   • Urobilinogen, UA 03/14/2022 1.0 E.U./dL  0.2 - 1.0 E.U./dL Final   • RBC, UA  03/14/2022 None Seen  None Seen, 0-2 /HPF Final   • WBC, UA 03/14/2022 0-2  None Seen, 0-2 /HPF Final   • Bacteria, UA 03/14/2022 None Seen  None Seen /HPF Final   • Squamous Epithelial Cells, UA 03/14/2022 3-6 (A) None Seen, 0-2 /HPF Final   • Hyaline Casts, UA 03/14/2022 None Seen  None Seen /LPF Final   • Methodology 03/14/2022 Manual Light Microscopy   Final   • Free T4 03/14/2022 1.43  0.93 - 1.70 ng/dL Final   • Interpretation 03/14/2022 Comment   Final    Negative  Not infected with HCV, unless recent infection is suspected or other  evidence exists to indicate HCV infection.      MRI Brain Without Contrast  Narrative: EXAM:  MR BRAIN WITHOUT IV CONTRAST    ORDERING PROVIDER:  KWAKU DENIS    CLINICAL HISTORY:   Headache    COMPARISON:     TECHNIQUE:   Axial T1-weighted, T2-weighted and diffusion weighted, FLAIR, and  sagittal T1-weighted images were obtained without administration  of gadolinium .    FINDINGS:    CEREBRAL PARENCHYMA: No abnormal signal. No encephalomalacia,  mass or gliosis. No atrophy.      POSTERIOR FOSSA: No mass or abnormal signal. Unremarkable  craniocervical junction.    EXTRA-AXIAL SPACES: No mass.     PITUITARY: No mass or parasellar abnormality.    ORBITS: No mass. Unremarkable extraocular muscles, globe and  optic nerve.    CALVARIA AND SOFT TISSUES:  No mass, adenopathy, or abnormal  signal.    TEMPORAL BONE AND SKULL BASE: Unremarkable mastoid air cells,  inner and middle ear.    PARANASAL SINUSES: Scattered mucoperiosteal thickening of the  aerated sinuses, most significant in bilateral maxillary sinuses  and to a lesser extent in bilateral ethmoidal and right frontal  sinuses.     VASCULAR STRUCTURES: Expected flow voids. No aneurysm, stenosis  or dissection seen.    DIFFUSION WEIGHTED IMAGES: No restricted diffusion.  Impression: acute intracranial process.  Scattered mucoperiosteal thickening of the aerated sinuses, most  significant in bilateral maxillary sinuses and to a  lesser extent  in bilateral ethmoidal, and right frontal sinuses.     Electronically signed by:  Demar Willams MD  8/9/2022 9:32 PM CDT  Workstation: [id number]      @[employer]@  Immunization History   Administered Date(s) Administered   • COVID-19 (MODERNA) 1st, 2nd, 3rd Dose Only 11/28/2021, 03/14/2022   • DTP 1989, 1989, 1989, 07/22/1992, 08/06/1993   • Hep B, Adolescent or Pediatric 10/02/2006   • HiB 07/22/1992, 08/06/1993   • MMR 05/11/1990, 08/30/1994   • OPV 1989, 1989, 1989, 08/06/1993   • Td 10/02/2006   • Tdap 09/19/2016     The following portions of the patient's history were reviewed and updated as appropriate: allergies, current medications, past family history, past medical history, past social history, past surgical history and problem list.    PHQ-9 Total Score:           Physical Exam  Constitutional:       Appearance: Normal appearance.   HENT:      Head: Normocephalic and atraumatic.      Right Ear: External ear normal.      Left Ear: External ear normal.   Eyes:      General:         Right eye: No discharge.         Left eye: No discharge.      Conjunctiva/sclera: Conjunctivae normal.   Cardiovascular:      Rate and Rhythm: Normal rate and regular rhythm.      Pulses: Normal pulses.      Heart sounds: Normal heart sounds. No murmur heard.  Pulmonary:      Effort: Pulmonary effort is normal. No respiratory distress.      Breath sounds: Normal breath sounds.   Abdominal:      General: There is no distension.      Palpations: Abdomen is soft.      Tenderness: There is no abdominal tenderness.   Musculoskeletal:      Cervical back: Normal range of motion.      Right lower leg: No edema.      Left lower leg: No edema.   Lymphadenopathy:      Cervical: No cervical adenopathy.   Neurological:      Mental Status: She is alert. Mental status is at baseline.   Psychiatric:         Mood and Affect: Mood normal.         Behavior: Behavior normal.         Assessment & Plan     Diagnosis Plan   1. Hypothyroidism associated with surgical procedure  TSH Rfx On Abnormal To Free T4   2. Herpes simplex vulvovaginitis  valACYclovir (VALTREX) 500 MG tablet   3. Class 3 severe obesity with serious comorbidity and body mass index (BMI) of 45.0 to 49.9 in adult, unspecified obesity type (HCC)  Ambulatory Referral to Bariatric Surgery   4. New onset headache  MRI Brain Without Contrast    SUMAtriptan (Imitrex) 50 MG tablet      Orders Placed This Encounter   Procedures   • MRI Brain Without Contrast     Order Specific Question:   Patient Pregnant     Answer:   No     Order Specific Question:   Release to patient     Answer:   Immediate   • TSH Rfx On Abnormal To Free T4     Order Specific Question:   Release to patient     Answer:   Immediate   • Ambulatory Referral to Bariatric Surgery     Referral Priority:   Routine     Referral Type:   Consultation     Referral Reason:   Specialty Services Required     Referred to Provider:   Benjamin Bingham MD     Requested Specialty:   Bariatrics     Number of Visits Requested:   1     New onset headache; likely migraine, will treat with sumatriptan, obtain MRI due to new onset nature.  No red flags on exam, reassuring.  Follow-up in 1 month, sooner if needed.    Chronic medical conditions as above, doing well medications, will refill.  Adjust medications as needed/tolerated, TSH levels as above.          This document has been electronically signed by James Angulo MD on August 21, 2022 23:14 CDT    EMR Dragon/Transciption Disclaimer: Some of this note may be an electronic transcription/translation of spoken language to printed text.  The electronic translation of spoken language may permit erroneous, or at times, nonsensical words or phrases to be inadvertently transcribed. Although I have reviewed the note for such errors, some may still exist.        No